# Patient Record
Sex: MALE | Race: WHITE | Employment: OTHER | URBAN - NONMETROPOLITAN AREA
[De-identification: names, ages, dates, MRNs, and addresses within clinical notes are randomized per-mention and may not be internally consistent; named-entity substitution may affect disease eponyms.]

---

## 2019-06-07 LAB
A/G RATIO: 2 (ref 1.5–2.5)
ABSOLUTE BASO #: 0 /CMM (ref 0–200)
ABSOLUTE EOS #: 300 /CMM (ref 0–500)
ABSOLUTE LYMPH #: 1300 /CMM (ref 1000–4800)
ABSOLUTE MONO #: 600 /CMM (ref 0–800)
ABSOLUTE NEUT #: 4700 /CMM (ref 1800–7700)
ALBUMIN SERPL-MCNC: 4.3 GM/DL (ref 3.5–5)
ALP BLD-CCNC: 119 IU/L (ref 41–137)
ALT SERPL-CCNC: 20 IU/L (ref 10–40)
ANION GAP SERPL CALCULATED.3IONS-SCNC: 11 MMOL/L (ref 4–12)
APTT: 77.9 SEC (ref 23–38)
AST SERPL-CCNC: 16 IU/L (ref 15–41)
BASOPHILS RELATIVE PERCENT: 0.2 % (ref 0–2)
BILIRUB SERPL-MCNC: 0.5 MG/DL (ref 0.2–1)
BUN BLDV-MCNC: 19 MG/DL (ref 7–20)
CALCIUM SERPL-MCNC: 9.4 MG/DL (ref 8.8–10.5)
CHLORIDE BLD-SCNC: 101 MEQ/L (ref 101–111)
CO2: 28 MEQ/L (ref 21–32)
CREAT SERPL-MCNC: 1.19 MG/DL (ref 0.6–1.3)
CREATININE CLEARANCE: 59
EOSINOPHILS RELATIVE PERCENT: 4.5 % (ref 0–6)
GLUCOSE: 136 MG/DL (ref 70–110)
HCT VFR BLD CALC: 39.9 % (ref 40–49)
HEMOGLOBIN: 13.2 GM/DL (ref 13.5–16.5)
LYMPHOCYTES RELATIVE PERCENT: 19.1 % (ref 15–45)
MCH RBC QN AUTO: 30.9 PG (ref 27.5–33)
MCHC RBC AUTO-ENTMCNC: 33 GM/DL (ref 33–36)
MCV RBC AUTO: 93.5 CU MIC (ref 80–97)
MONOCYTES RELATIVE PERCENT: 8.6 % (ref 2–10)
NEUTROPHILS RELATIVE PERCENT: 67.6 % (ref 40–70)
NUCLEATED RBCS: 0 /100 WBC
PDW BLD-RTO: 14.8 % (ref 12–16)
PLATELET # BLD: 207 TH/CMM (ref 150–400)
POTASSIUM SERPL-SCNC: 4 MEQ/L (ref 3.6–5)
RBC # BLD: 4.26 MIL/CMM (ref 4.5–6)
SODIUM BLD-SCNC: 140 MEQ/L (ref 135–145)
TOTAL PROTEIN: 6.5 G/DL (ref 6.2–8)
WBC # BLD: 6.9 TH/CMM (ref 4.4–10.5)

## 2020-06-29 LAB
A/G RATIO: 2.2 (ref 1.5–2.5)
ABSOLUTE BASO #: 100 /CMM (ref 0–200)
ABSOLUTE EOS #: 200 /CMM (ref 0–500)
ABSOLUTE LYMPH #: 1400 /CMM (ref 1000–4800)
ABSOLUTE MONO #: 600 /CMM (ref 0–800)
ABSOLUTE NEUT #: 3900 /CMM (ref 1800–7700)
ALBUMIN SERPL-MCNC: 4.3 GM/DL (ref 3.5–5)
ALP BLD-CCNC: 119 IU/L (ref 41–137)
ALT SERPL-CCNC: 24 IU/L (ref 10–40)
ANION GAP SERPL CALCULATED.3IONS-SCNC: 10 MMOL/L (ref 4–12)
APTT: 85.6 SEC (ref 23–38)
AST SERPL-CCNC: 18 IU/L (ref 15–41)
BASOPHILS RELATIVE PERCENT: 1.1 % (ref 0–2)
BILIRUB SERPL-MCNC: 0.5 MG/DL (ref 0.2–1)
BUN BLDV-MCNC: 29 MG/DL (ref 7–20)
CALCIUM SERPL-MCNC: 9.6 MG/DL (ref 8.8–10.5)
CHLORIDE BLD-SCNC: 103 MEQ/L (ref 101–111)
CO2: 24 MEQ/L (ref 21–32)
CREAT SERPL-MCNC: 1.35 MG/DL (ref 0.6–1.3)
CREATININE CLEARANCE: 51
EOSINOPHILS RELATIVE PERCENT: 3.9 % (ref 0–6)
GLUCOSE: 94 MG/DL (ref 70–110)
HCT VFR BLD CALC: 38 % (ref 40–49)
HEMOGLOBIN: 12.8 GM/DL (ref 13.5–16.5)
LYMPHOCYTES RELATIVE PERCENT: 22.2 % (ref 15–45)
MCH RBC QN AUTO: 31.4 PG (ref 27.5–33)
MCHC RBC AUTO-ENTMCNC: 33.6 GM/DL (ref 33–36)
MCV RBC AUTO: 93.4 CU MIC (ref 80–97)
MONOCYTES RELATIVE PERCENT: 10.1 % (ref 2–10)
NEUTROPHILS RELATIVE PERCENT: 62.7 % (ref 40–70)
NUCLEATED RBCS: 0.1 /100 WBC
PDW BLD-RTO: 14.4 % (ref 12–16)
PLATELET # BLD: 225 TH/CMM (ref 150–400)
POTASSIUM SERPL-SCNC: 4.5 MEQ/L (ref 3.6–5)
RBC # BLD: 4.07 MIL/CMM (ref 4.5–6)
SODIUM BLD-SCNC: 137 MEQ/L (ref 135–145)
TOTAL PROTEIN: 6.3 G/DL (ref 6.2–8)
WBC # BLD: 6.3 TH/CMM (ref 4.4–10.5)

## 2021-04-01 ENCOUNTER — HOSPITAL ENCOUNTER (OUTPATIENT)
Dept: PHYSICAL THERAPY | Age: 76
Setting detail: THERAPIES SERIES
Discharge: HOME | End: 2021-04-01
Payer: MEDICARE

## 2021-04-01 ENCOUNTER — HOSPITAL ENCOUNTER (OUTPATIENT)
Dept: OCCUPATIONAL THERAPY | Age: 76
Setting detail: THERAPIES SERIES
Discharge: HOME | End: 2021-04-01
Payer: MEDICARE

## 2021-04-01 PROCEDURE — 97162 PT EVAL MOD COMPLEX 30 MIN: CPT

## 2021-04-01 PROCEDURE — 97165 OT EVAL LOW COMPLEX 30 MIN: CPT

## 2021-04-01 NOTE — FLOWSHEET NOTE
care independently. He walked into the clinic today with a rollator and was extremely short of breath, however his oxygen saturation levels were 98%. He has a prior history of left SILVIO >10 yrs ago and history of lumbar fusion as well all of which contribute to difficulty walking causing him to walk with [facility] prior to recent hospitalization. He has continued with his HEP since being discharged and is working on adjusting to new medications. Social/Functional History and Current Status:  Medications and Allergies have been reviewed and are listed on Medical History Questionnaire. Gaetano Rose lives with family in an apartment with a level surface to enter.     Task Previous Current   ADLs  Independent Independent   IADL's Independent Independent   Ambulation Assistance Required Assistance Required   Transfers Independent Modified Independent   Recreation Independent Dependent/Unable   Community Integration Independent Independent   Driving Active  Does not drive   Work Retired  Retired     OBJECTIVE:    Pain: 0/10   Palpation    Observation Very pale complexion; dry, thin skin; bruises throughout his UE; grabs a hold of his pant leg to lift leg up to don/doff shoes    Posture Stands with forward flexed trunk       Range of Motion    Strength Left hip flexor, quads, hamstrings, abd/add: 3/5; gross strength of right LE: 4/5   Coordination    Sensation Diabetic neuropathy   Bed Mobility    Transfers Heavily dependent upon UE with all transfers   Ambulation Walks with rollator and stooped posture; heavily dependent upon UE for support   Stairs Ascends with right; descends with left    Balance Standing balance is fair   Special Tests          TREATMENT   Precautions:    Pain: 0/10    X in shaded column indicates activity completed today   Modalities Parameters/  Location  Notes                     Manual Therapy Time/Technique  Notes                     Exercise/Intervention   Notes   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 Patient Education:   [x]  HEP/Education Completed: Plan of Care, Goals, discussed treatment plan; aquatics    [employer] Access Code:  []  No new Education completed  [x]  Reviewed Prior HEP-provided by rehab      [x]  Patient verbalized and/or demonstrated understanding of education provided. []  Patient unable to verbalize and/or demonstrate understanding of education provided. Will continue education. [x]  Barriers to learning: n/a     PLAN:  Treatment Recommendations: Strengthening, Range of Motion, Balance Training, Functional Mobility Training, Endurance Training, Gait Training, Stair Training, Neuromuscular Re-education, Home Exercise Program, Patient Education, Safety Education and Training and Aquatics    [x]  Plan of care initiated. Plan to see patient 2 times per week for 6 weeks to address the treatment planned outlined above.   []  Continue with current plan of care  []  Modify plan of care as follows:    []  Hold pending physician visit  []  Discharge    Time In 0915   Time Out 1000   Timed Code Minutes: 0 min   Total Treatment Time: 45 min       Electronically Signed by: Selena Frances \"Gay\" Devin Hodgson, AUNDREA  [id number]

## 2021-04-01 NOTE — FLOWSHEET NOTE
hyperlipidemia, DVT, Lupus, polyarthritis, anticoagulation, asthma, pneumonia, pulmonary embolism, diabetes, hepatitis B, BPH, arthritis, basal ricky carcinoma, anemia, left total hip replacement, spinal fusion,      SUBJECTIVE: Patient reports he is set up to see an arthritis doctor which is 4 weeks off. Social/Functional History:  Medications and Allergies have been reviewed and are listed on the Medical History [address] lives With son currently. in a single story home with stairs and no handrail to enter. His house is multilevel. Task Prior Level of Function  (current level of function addressed below)   ADLs  Modified Independent. Increased time. 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 OBJECTIVE:  Hand Dominance right handed   Observation Right ulnar styloid enlarged   Posture    Edema Edema in B LEs       Vision Corrected   Hearing no hearing aid, but states he does not hear well. Cognition  WFL       ADL's: Per patient report: Feeding: Modified Independent. assist for cutting food if needed  Grooming: Independent. Bathing: Modified Independent. increased time  Upper Extremity Dressing: Minimal Assistance. assist with buttoning buttons, difficulty with zipping  Lower Extremity Dressing: Modified Independent. occasional assist with donning support socks. Has elastic laces so slips shoes on. Toileting: Modified Independent. Toilet Transfer: Modified Independent. uses counter top and cane. Tub Transfer: Modified Independent.  has grab bar  Shower Transfer: Not tested. Tub/Shower Set-Up Walk-in Shower at his house.   Tub/shower at [facility] Set-Up Handicapped [facility] in [address] [place] [address], [address] [place] [address] and [facility], 2 wheeled walker, cane, grab bar in tub shower, elastic shoe laces   Bed Mobility  Not Tested   Transfers Sit to Stand:  Modified Independent. Stand to Sit: Modified Independent. Balance SBA   Functional Mobility Assistive Device: 4 Wheeled Walker  Assist Level:  Modified independence. Distance: 30 feet, in clinic. Some shortness of breath reported. Pulse ox at 98%        Sensation Patient reports numbness and tingling in both hands. Coordination Impaired: Unable to to oppose to right 3, 4th and 5th digit due to thumb arthritis. Movement Descriptors within functional limits   Tone within functional limits       RIGHT and LEFT UPPER EXTREMITY  RANGE OF MOTION    AROM PROM COMMENTS      Bilateral shoulder ROM is [facility] [facility]   Shoulder Flexion      Shoulder Extension      Shoulder Abduction      Shoulder External Rotation      Shoulder Internal Rotation      Elbow Flexion WFL     Elbow Extension (-) 35 degrees     Wrist Flexion      Wrist Extension      General Hand ROM   Active left forearm supination to ~ neutral.  Patient reports left elbow was \"shattered\" and had surgery as a child.          RIGHT and LEFT UPPER EXTREMITY  STRENGTH     COMMENTS   Shoulder Flexion 4-/5    Shoulder Abduction 4-/5    Elbow Extension 4-/5    Elbow Flexion 4-/5    Wrist Extension     Wrist Flexion          RIGHT LEFT    Strength Settinnd 30 30   Pinch Strength Tip Pinch:      Lateral Pinch 13 7    3 Point Pinch        General Strength Comment        TREATMENT   Precautions: fall risk    Pain: none     X in shaded column indicates Activity Completed Today   Modalities Parameters/  Location  Notes/Comments                     Manual Therapy Time/  Technique  Notes/Comments                     Exercises   Sets/  Sec Reps  Notes/Comments                                                    Activities Time    Notes/Comments                       Specific Interventions Next Treatment: UE strengthening, endurance, HEP, ADLs, adaptive equipment and technique education    Activity/Treatment Tolerance:  [x]  Patient tolerated treatment well  []  Patient limited by fatigue  []  Patient limited by pain   []  Patient limited by other medical complications  []  Other:     Assessment: Patient with general deconditioning, decreased strength and endurance for previous level of activities. Patient has impaired fine motor coordination due to arthritis. Patient requires skilled OT to increase endurance, UE strength, ease with ADLs and education on adaptive equipment and techniques for ease and independence with self care and prior level of activities. Areas for Improvement: edema, impaired coordination, impaired endurance and impaired strength  Prognosis: fair/good    GOALS:  Patient Goal: Return to driving a car, walk better, and increase strength and endurance, mow the lawn. Short Term Goals:  Time Frame: 5 weeks  1. Patient will be independent with UE HEP for increased ease with eventual driving and car transfers. 2.  Patient will increase stand endurance x 6 minutes during UE functional activity for increased endurance for ambulation to and from car to appointments. 3.  Patient will increase B  strength to 35# for increased ease with opening jars. 4.  Patient will explore adaptive equipment and techniques for increased ease with self care and dressing. Long Term Goals:  Time Frame: 8 weeks  1. Patient will improve UEFS to at least 78.  2.  Patient will increase stand endurance to 8 minutes for increased endurance for ADLs. 3.  Patient will be independent with UE finalized HEP for ease with eventual return to mowing his lawn. Patient Education:   [x]  HEP/Education Completed: Plan of Care, Goals   [address] Access Code for HEP:   []  No new Education completed  []  Reviewed Prior HEP      [x]  Patient verbalized and/or demonstrated understanding of education provided.   []  Patient unable to verbalize and/or demonstrate understanding of education provided. Will continue education. [x]  Barriers to learning: none    PLAN:  Treatment Recommendations: Strengthening, Endurance Training and Home Exercise Program    [x]  Plan of care initiated. Plan to see patient 2 times per week for 10 weeks to address the treatment planned outlined above. []  Continue with current plan of care  []  Modify plan of care as follows:    []  Hold pending physician visit  []  Discharge    Time In 0835   Time Out 0915   Timed Code Minutes: 0 min   Total Treatment Time: 40 min       Electronically Signed by:  Jordan Pappas OTR/L #[id number]

## 2021-04-08 ENCOUNTER — HOSPITAL ENCOUNTER (OUTPATIENT)
Dept: OCCUPATIONAL THERAPY | Age: 76
Setting detail: THERAPIES SERIES
Discharge: HOME | End: 2021-04-08
Payer: MEDICARE

## 2021-04-08 ENCOUNTER — HOSPITAL ENCOUNTER (OUTPATIENT)
Dept: PHYSICAL THERAPY | Age: 76
Setting detail: THERAPIES SERIES
Discharge: HOME | End: 2021-04-08
Payer: MEDICARE

## 2021-04-08 PROCEDURE — 97110 THERAPEUTIC EXERCISES: CPT

## 2021-04-08 NOTE — PROGRESS NOTES
Specific Interventions Next Treatment: NuStep; standing balance activities; dynamic gait activities; endurance     Activity/Treatment Tolerance:  [x]  Patient tolerated treatment well  [x]  Patient limited by fatigue  []  Patient limited by pain   []  Patient limited by medical complications  []  Other:     Assessment: Pt struggles with endurance levels. 2 seated restbreaks required during standing exercises. O2 sats checked and stayed at 98%. Pitting edema noted in B calves. Body Structures/Functions/Activity Limitations: impaired activity tolerance, impaired balance, impaired coordination, impaired endurance, impaired ROM, impaired sensation, impaired strength and abnormal gait  Prognosis: fair    GOALS:  Patient Goal: Improve balance and stamina    Short Term Goals:  Time Frame: 3 weeks  1. Jose Manuel Del Toro will demonstrate a good ankle strategy when maintaining balance with perturbations to his trunk. 2. Jose Manuel Del Toro will demonstrate \"nose over toes\" positioning with sit-stand transfers without needing verbal cuing. 3. Doug's Tinetti score will improve to 19/28 indicating minimal fall risk. Long Term Goals:  Time Frame: 6 weeks   1. Jose Manuel Del Toro will transition from walking with rollator to [facility] for greater ease with community ambulation and to return to [facility]. 2. Jose Manuel Del Toro will be discharged from PT with independent HEP. 3. Doug's Tinetti score will improve to >23/28 indicating no fall risk. 4. 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 Patient Education:   [x]  HEP/Education Completed: at countertop: heel toe raises, marching, mini squats    [employer] Access Code:  []  No new Education completed  []  Reviewed Prior HEP-provided by rehab      [x]  Patient verbalized and/or demonstrated understanding of education provided. []  Patient unable to verbalize and/or demonstrate understanding of education provided.   Will continue education. [x]  Barriers to learning: n/a     PLAN:  Treatment Recommendations: Strengthening, Range of Motion, Balance Training, Functional Mobility Training, Endurance Training, Gait Training, Stair Training, Neuromuscular Re-education, Home Exercise Program, Patient Education, Safety Education and Training and Aquatics    []  Plan of care initiated. Plan to see patient 2 times per week for 6 weeks to address the treatment planned outlined above.   [x]  Continue with current plan of care  []  Modify plan of care as follows:    []  Hold pending physician visit  []  Discharge    Time In 0900   Time Out 0930   Timed Code Minutes: 30 min   Total Treatment Time: 30 min       Electronically Signed by: Abhay Mcclain

## 2021-04-08 NOTE — PROGRESS NOTES
[address]  [] EVALUATION  [x] DAILY NOTE (LAND) [] DAILY NOTE (AQUATIC ) [] PROGRESS NOTE [] DISCHARGE NOTE    [] OUTPATIENT REHABILITATION CENTER [facility]   [] Brenda     [] [address] [facility]   [x] Dierdre Leaver    Date: 2021  Patient Name:  Ria Penn  : 1945  MRN: [id number]  CSN: [id number]    Referring Practitioner Karina BOYD   Diagnosis Gout    Treatment Diagnosis Impaired UE strength, impaired endurance, decreased ADLs   Date of Evaluation 21      Functional Outcome Measure Used Functional Impact Questionaire   Functional Outcome Score 68 (21)       Insurance: Primary: Payor: MEDICARE /  /  / ,   Secondary: [facility]   Authorization Information: Unlimited visits based on medical necessity   Visit # 2, 2/10 for progress note   Visits Allowed: Unlimited based on medical necessity   Recertification Date:    Physician Follow-Up: Not stated   Physician Orders: Script dated 3/25/2021 for OT eval and treat   Pertinent History: Patient was admitted to [facility] on 3/12/21 with lower extremity swelling, deconditioned state. Negative for DVT. Patient had inpatient rehab due to deconditioning. PMH includes per medical notes from [facility] includes: Neuropathy, hypertension, hypercholesterolemia, hyperlipidemia, DVT, Lupus, polyarthritis, anticoagulation, asthma, pneumonia, pulmonary embolism, diabetes, hepatitis B, BPH, arthritis, basal ricky carcinoma, anemia, left total hip replacement, spinal fusion,      SUBJECTIVE: Patient reports he gets worn out easy. He has been doing some exercises at home.   Patient reports he has a bid blister on his right index finger tip and is unsure of how it happened    OBJECTIVE:      TREATMENT   Precautions: fall risk    Pain: none     X in shaded column indicates Activity Completed Today   Modalities Parameters/  Location  Notes/Comments Manual Therapy Time/  Technique  Notes/Comments                     Exercises   Sets/  Sec Reps  Notes/Comments   Yellow putty for B  and pinch strengthening   X 3 minutes   [place] theraband in sitting for B horizontal abduction, shoulder flexion and diaganols up  10 reps each X 15 reps for horizontal abduction   Patient ambulated with 4 wheeled walker and modified independence in clinic   X 30 feet, then 20 feet                               Activities Time    Notes/Comments   Discussed adaptive equipment options available for in the bathroom, dressing, writing, eating. X Adaptive equipment catalog issued                 Specific Interventions Next Treatment: UE strengthening, endurance, HEP, ADLs, adaptive equipment and technique education    Activity/Treatment Tolerance:  [x]  Patient tolerated treatment well  []  Patient limited by fatigue  []  Patient limited by pain   []  Patient limited by other medical complications  []  Other:     Assessment: Patient tolerated treatment well. Patient with limited endurance for tasks. Areas for Improvement: edema, impaired coordination, impaired endurance and impaired strength  Prognosis: fair/good    GOALS:  Patient Goal: Return to driving a car, walk better, and increase strength and endurance, mow the lawn. Short Term Goals:  Time Frame: 5 weeks  1. Patient will be independent with UE HEP for increased ease with eventual driving and car transfers. 2.  Patient will increase stand endurance x 6 minutes during UE functional activity for increased endurance for ambulation to and from car to appointments. 3.  Patient will increase B  strength to 35# for increased ease with opening jars. 4.  Patient will explore adaptive equipment and techniques for increased ease with self care and dressing. Long Term Goals:  Time Frame: 8 weeks  1.   Patient will improve UEFS to at least 78.  2.  Patient will increase stand endurance to 8 minutes for increased endurance for ADLs. 3.  Patient will be independent with UE finalized HEP for ease with eventual return to mowing his lawn. Patient Education:   [x]  HEP/Education Completed: Plan of Care, Goals, adaptive equipment options available for ADLS, IADLs with catalog issued   [address] Access Code for HEP:   []  No new Education completed  [x]  Reviewed Prior HEP      [x]  Patient verbalized and/or demonstrated understanding of education provided. []  Patient unable to verbalize and/or demonstrate understanding of education provided. Will continue education. [x]  Barriers to learning: none    PLAN:  Treatment Recommendations: Strengthening, Endurance Training and Home Exercise Program    []  Plan of care initiated. Plan to see patient 2 times per week for 10 weeks to address the treatment planned outlined above. [x]  Continue with current plan of care  []  Modify plan of care as follows:    []  Hold pending physician visit  []  Discharge    Time In [id number]   Time Out 0900   Timed Code Minutes: 0 min   Total Treatment Time: 26 min       Electronically Signed by:  Chelsi Salcido OTR/L #[id number]

## 2021-04-13 ENCOUNTER — HOSPITAL ENCOUNTER (OUTPATIENT)
Dept: PHYSICAL THERAPY | Age: 76
Setting detail: THERAPIES SERIES
Discharge: HOME | End: 2021-04-13
Payer: MEDICARE

## 2021-04-13 PROCEDURE — 97110 THERAPEUTIC EXERCISES: CPT

## 2021-04-13 NOTE — PROGRESS NOTES
[address] [facility]  PHYSICAL THERAPY  [] EVALUATION  [x] DAILY NOTE (LAND) [] DAILY NOTE (AQUATIC ) [] PROGRESS NOTE [] DISCHARGE NOTE    [] [facility]   [] Brenda [address]    [] [address] [facility]   [x] Tigist Mueller    Date: 2021  Patient Name:  Armando Holland  : 1945  MRN: [id number]  CSN: [id number]    Referring Practitioner DEB Krishnan -*   Diagnosis Gouty Arthropathy    Treatment Diagnosis Difficulty walking; balance dysfunction    Date of Evaluation 21    Additional Pertinent History DM; SOB; HTN      Functional Outcome Measure Used Tinetti   Functional Outcome Score  (21)       Insurance: Primary: Payor: Gerardo Mistry /  /  / ,   Secondary:    Authorization Information: Pre-certification not needed    Visit # 3, 3/10 for progress note   Visits Allowed: Based on medical necessity    Recertification Date: May 27, 21   Physician Follow-Up:    Physician Orders:    History of Present Illness: Jordan Ambriz was recently discharged from [facility] rehab following hospitalization for cellulitis in left LE. SUBJECTIVE: Pt. Reports doing exercises at home and using a SC toambulate at home    TREATMENT   Precautions:    Pain: 0/10 R knee, 0/10 L knee    X in shaded column indicates activity completed today   Modalities Parameters/  Location  Notes                     Manual Therapy Time/Technique  Notes                     Exercise/Intervention   Notes   Discussed HEP provided by rehab staff at [facility]; provided blue band   x           Nustep level5 5min.  x           Dynamic gait in // bars: forward, side stepping, retro 2 laps  x           Heel toe raises, marching, mini squats 10x    x           Sitting: LAQ, marching, Hip abduction, adductor squeeze 10x  x 5x=with hip abduction          Sit to stand 5x  x B UE support required     Specific Interventions Next Treatment: NuStep; standing balance activities; dynamic gait activities; endurance     Activity/Treatment Tolerance:  [x]  Patient tolerated treatment well  [x]  Patient limited by fatigue  []  Patient limited by pain   []  Patient limited by medical complications  []  Other:     Assessment: Pt struggles with endurance levels. 2 seated restbreaks required during standing exercises. O2 sats checked and stayed at 98%. Pitting edema noted in B calves. Body Structures/Functions/Activity Limitations: impaired activity tolerance, impaired balance, impaired coordination, impaired endurance, impaired ROM, impaired sensation, impaired strength and abnormal gait  Prognosis: fair    GOALS:  Patient Goal: Improve balance and stamina    Short Term Goals:  Time Frame: 3 weeks  1. Radha Giron will demonstrate a good ankle strategy when maintaining balance with perturbations to his trunk. 2. Radha Giron will demonstrate \"nose over toes\" positioning with sit-stand transfers without needing verbal cuing. 3. Doug's Tinetti score will improve to 19/28 indicating minimal fall risk. Long Term Goals:  Time Frame: 6 weeks   1. Radha Giron will transition from walking with rollator to [facility] for greater ease with community ambulation and to return to [facility]. 2. Radha Giron will be discharged from PT with independent HEP. 3. Doug's Tinetti score will improve to >23/28 indicating no fall risk. 4. 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 Patient Education:   [x]  HEP/Education Completed: at countertop: heel toe raises, marching, mini squats    [employer] Access Code:  []  No new Education completed  []  Reviewed Prior HEP-provided by rehab      [x]  Patient verbalized and/or demonstrated understanding of education provided. []  Patient unable to verbalize and/or demonstrate understanding of education provided. Will continue education.   [x]  Barriers to learning: n/a     PLAN:  Treatment

## 2021-04-15 ENCOUNTER — HOSPITAL ENCOUNTER (OUTPATIENT)
Dept: PHYSICAL THERAPY | Age: 76
Setting detail: THERAPIES SERIES
Discharge: HOME | End: 2021-04-15
Payer: MEDICARE

## 2021-04-15 ENCOUNTER — HOSPITAL ENCOUNTER (OUTPATIENT)
Dept: OCCUPATIONAL THERAPY | Age: 76
Setting detail: THERAPIES SERIES
Discharge: HOME | End: 2021-04-15
Payer: MEDICARE

## 2021-04-15 PROCEDURE — 97112 NEUROMUSCULAR REEDUCATION: CPT

## 2021-04-15 PROCEDURE — 97140 MANUAL THERAPY 1/> REGIONS: CPT

## 2021-04-15 PROCEDURE — 97110 THERAPEUTIC EXERCISES: CPT

## 2021-04-15 NOTE — PROGRESS NOTES
column indicates Activity Completed Today   Modalities Parameters/  Location  Notes/Comments                     Manual Therapy Time/  Technique  Notes/Comments                     Exercises   Sets/  Sec Reps  Notes/Comments   Yellow putty for Left  and pinch strengthening    3 minutes   [place] theraband in sitting for B horizontal abduction, shoulder flexion and diaganols up  10 reps each X    Patient ambulated with 4 wheeled walker and modified independence in clinic    30 feet, then 20 feet   STM to right wrist with concentration on ulnar side for pain relief and tight musculature   X Mild crepitus palpated on ulnar side. Patient states this helped the pain. Yellow spring loaded gripper (10#) resistance   10 each X Left hand and right hand   PROM to right wrist flexion/extension and right digits individual and composite flexion/extension   X    Biodex at 120 RPM, 2 minutes forward and 1 minute backward   X    Activities Time    Notes/Comments   Discussed adaptive equipment options available for in the bathroom, dressing, writing, eating. Adaptive equipment catalog issued                 Specific Interventions Next Treatment: UE strengthening, endurance, HEP, ADLs, adaptive equipment and technique education    Activity/Treatment Tolerance:  [x]  Patient tolerated treatment well  []  Patient limited by fatigue  []  Patient limited by pain   []  Patient limited by other medical complications  []  Other:     Assessment: Patient tolerated treatment well. Patient with limited endurance for tasks. Areas for Improvement: edema, impaired coordination, impaired endurance and impaired strength  Prognosis: fair/good    GOALS:  Patient Goal: Return to driving a car, walk better, and increase strength and endurance, mow the lawn. Short Term Goals:  Time Frame: 5 weeks  1. Patient will be independent with UE HEP for increased ease with eventual driving and car transfers.   2.  Patient will increase stand endurance x 6 minutes during UE functional activity for increased endurance for ambulation to and from car to appointments. 3.  Patient will increase B  strength to 35# for increased ease with opening jars. 4.  Patient will explore adaptive equipment and techniques for increased ease with self care and dressing. Long Term Goals:  Time Frame: 8 weeks  1. Patient will improve UEFS to at least 78.  2.  Patient will increase stand endurance to 8 minutes for increased endurance for ADLs. 3.  Patient will be independent with UE finalized HEP for ease with eventual return to mowing his lawn. Patient Education:   []  HEP/Education Completed: Plan of Care, Goals, adaptive equipment options available for ADLS, IADLs with catalog issued   [address] Access Code for HEP:   []  No new Education completed  [x]  Reviewed Prior HEP      [x]  Patient verbalized and/or demonstrated understanding of education provided. []  Patient unable to verbalize and/or demonstrate understanding of education provided. Will continue education. [x]  Barriers to learning: none    PLAN:  Treatment Recommendations: Strengthening, Endurance Training and Home Exercise Program    []  Plan of care initiated. Plan to see patient 2 times per week for 10 weeks to address the treatment planned outlined above. [x]  Continue with current plan of care  []  Modify plan of care as follows:    []  Hold pending physician visit  []  Discharge    Time In 0933   Time Out 1013   Timed Code Minutes: 40 min   Total Treatment Time: 40 min       Electronically Signed by:  Baldo STOKES/L #[id number]

## 2021-04-15 NOTE — PROGRESS NOTES
[address] [facility]  PHYSICAL THERAPY  [] EVALUATION  [x] DAILY NOTE (LAND) [] DAILY NOTE (AQUATIC ) [] PROGRESS NOTE [] DISCHARGE NOTE    [] [facility]   [] [address]    [] [address] [facility]   [x] [place]    Date: 4/15/2021  Patient Name:  Alysha Engel  : 1945  MRN: [id number]  CSN: [id number]    Referring Practitioner DEB Velasquez -*   Diagnosis Gouty Arthropathy    Treatment Diagnosis Difficulty walking; balance dysfunction    Date of Evaluation 21    Additional Pertinent History DM; SOB; HTN      Functional Outcome Measure Used Tinetti   Functional Outcome Score  (21)       Insurance: Primary: Payor: Bob Uribe /  /  / ,   Secondary:    Authorization Information: Pre-certification not needed    Visit # 4, 4/10 for progress note   Visits Allowed: Based on medical necessity    Recertification Date: May 27, 21   Physician Follow-Up:    Physician Orders:    History of Present Illness: Ester Charlton was recently discharged from [facility] rehab following hospitalization for cellulitis in left LE. SUBJECTIVE: Pt. Reports getting a lot of work done at home yesterday    TREATMENT   Precautions:    Pain:4/10 R wrist    X in shaded column indicates activity completed today   Modalities Parameters/  Location  Notes                     Manual Therapy Time/Technique  Notes                     Exercise/Intervention   Notes   Discussed HEP provided by rehab staff at [facility]; provided blue band              Nustep level5 5min.  x           Dynamic gait in // bars: forward, side stepping, retro 2 laps  x           Heel toe raises, marching, mini squats 10x    x           Sitting: LAQ, marching, Hip abduction, adductor squeeze 10x  x 5x=with hip abduction          Sit to stand 5x  x B UE support required     Specific Interventions Next Treatment: NuStep; standing balance activities; dynamic gait activities; endurance     Activity/Treatment Tolerance:  [x]  Patient tolerated treatment well  [x]  Patient limited by fatigue  []  Patient limited by pain   []  Patient limited by medical complications  []  Other:     Assessment: Pt struggles with endurance levels. 2 seated restbreaks required during standing exercises. Body Structures/Functions/Activity Limitations: impaired activity tolerance, impaired balance, impaired coordination, impaired endurance, impaired ROM, impaired sensation, impaired strength and abnormal gait  Prognosis: fair    GOALS:  Patient Goal: Improve balance and stamina    Short Term Goals:  Time Frame: 3 weeks  1. Antonia Dennis will demonstrate a good ankle strategy when maintaining balance with perturbations to his trunk. 2. Antonia Dennis will demonstrate \"nose over toes\" positioning with sit-stand transfers without needing verbal cuing. 3. Doug's Tinetti score will improve to 19/28 indicating minimal fall risk. Long Term Goals:  Time Frame: 6 weeks   1. Antonia Dennis will transition from walking with rollator to [facility] for greater ease with community ambulation and to return to [facility]. 2. Antonia Dennis will be discharged from PT with independent HEP. 3. Doug's Tinetti score will improve to >23/28 indicating no fall risk. 4. Antonia Dennis will be able to walk 200 ft at a time without needing a rest break so he is able to walk into the restaurant from his car without fatigue. Patient Education:   [x]  HEP/Education Completed: at countertop: heel toe raises, marching, mini squats    [employer] Access Code:  []  No new Education completed  []  Reviewed Prior HEP-provided by rehab      [x]  Patient verbalized and/or demonstrated understanding of education provided. []  Patient unable to verbalize and/or demonstrate understanding of education provided. Will continue education.   [x]  Barriers to learning: n/a     PLAN:  Treatment Recommendations: Strengthening, Range of Motion, Balance Training, Functional Mobility Training, Endurance Training, Gait Training, Stair Training, Neuromuscular Re-education, Home Exercise Program, Patient Education, Safety Education and Training and Aquatics    []  Plan of care initiated. Plan to see patient 2 times per week for 6 weeks to address the treatment planned outlined above.   [x]  Continue with current plan of care  []  Modify plan of care as follows:    []  Hold pending physician visit  []  Discharge    Time In 0900   Time Out 0930   Timed Code Minutes: 30 min   Total Treatment Time: 30 min       Electronically Signed by: Micaela Adams

## 2021-04-19 ENCOUNTER — HOSPITAL ENCOUNTER (OUTPATIENT)
Dept: PHYSICAL THERAPY | Age: 76
Setting detail: THERAPIES SERIES
Discharge: HOME | End: 2021-04-19
Payer: MEDICARE

## 2021-04-19 ENCOUNTER — HOSPITAL ENCOUNTER (OUTPATIENT)
Dept: OCCUPATIONAL THERAPY | Age: 76
Setting detail: THERAPIES SERIES
Discharge: HOME | End: 2021-04-19
Payer: MEDICARE

## 2021-04-19 PROCEDURE — 97140 MANUAL THERAPY 1/> REGIONS: CPT

## 2021-04-19 PROCEDURE — 97110 THERAPEUTIC EXERCISES: CPT

## 2021-04-19 NOTE — PROGRESS NOTES
[address] [facility]  PHYSICAL THERAPY  [] EVALUATION  [x] DAILY NOTE (LAND) [] DAILY NOTE (AQUATIC ) [] PROGRESS NOTE [] DISCHARGE NOTE    [] [address] [facility]   [] Brenda     [] [address] AKI   [x] Leticia Stoner    Date: 2021  Patient Name:  Rogelio Johnson  : 1945  MRN: [id number]  CSN: [id number]    Referring Practitioner DEB Phillips -*   Diagnosis Gouty Arthropathy    Treatment Diagnosis Difficulty walking; balance dysfunction    Date of Evaluation 21    Additional Pertinent History DM; SOB; HTN      Functional Outcome Measure Used Tinetti   Functional Outcome Score  (21)       Insurance: Primary: Payor: Kiko Clemons /  /  / ,   Secondary:    Authorization Information: Pre-certification not needed    Visit # 5 5/10 for progress note   Visits Allowed: Based on medical necessity    Recertification Date: May 27, 21   Physician Follow-Up:    Physician Orders:    History of Present Illness: Inga Caceres was recently discharged from [facility] rehab following hospitalization for cellulitis in left LE.      SUBJECTIVE: Pt. Reports getting a lot of work done at home yesterday    TREATMENT   Precautions:    Pain:4/10 R wrist    X in shaded column indicates activity completed today   Modalities Parameters/  Location  Notes                     Manual Therapy Time/Technique  Notes   Stretch right hip flexor off edge of mat--long leg traction to stretch  - 5 min  Also quad sets to straigthen knee  x                Exercise/Intervention   Notes   Discussed HEP provided by rehab staff at [facility]; provided blue band              Nustep  Arms 10 , legs 11  Level 5 6 min x           Dynamic gait in // bars: forward, side stepping, retro 2 laps  x           Heel toe raises, marching, mini squats 10x    x  unable to do standing heel raise          Sitting: LAQ, marching, Hip abduction with green band , adductor

## 2021-04-19 NOTE — PROGRESS NOTES
[address] THERAPY  [] EVALUATION  [x] DAILY NOTE (LAND) [] DAILY NOTE (AQUATIC ) [] PROGRESS NOTE [] DISCHARGE NOTE    [] [facility]   [] Brenda Jesus    [] [address] AKI   [x] Delicia Potter    Date: 2021  Patient Name:  Hay Ng  : 1945  MRN: [id number]  CSN: [id number]    Referring Practitioner Manuela BOYD   Diagnosis Gout    Treatment Diagnosis Impaired UE strength, impaired endurance, decreased ADLs   Date of Evaluation 21      Functional Outcome Measure Used Functional Impact Questionaire   Functional Outcome Score 68 (21)       Insurance: Primary: Payor: MEDICARE /  /  / ,   Secondary: [facility]   Authorization Information: Unlimited visits based on medical necessity   Visit # 4, 4/10 for progress note   Visits Allowed: Unlimited based on medical necessity   Recertification Date: 3/76/3683   Physician Follow-Up: Not stated   Physician Orders: Script dated 3/25/2021 for OT eval and treat   Pertinent History: Patient was admitted to [facility] on 3/12/21 with lower extremity swelling, deconditioned state. Negative for DVT. Patient had inpatient rehab due to deconditioning. PMH includes per medical notes from [facility] includes: Neuropathy, hypertension, hypercholesterolemia, hyperlipidemia, DVT, Lupus, polyarthritis, anticoagulation, asthma, pneumonia, pulmonary embolism, diabetes, hepatitis B, BPH, arthritis, basal ricky carcinoma, anemia, left total hip replacement, spinal fusion,      SUBJECTIVE: Patient reports his right index finger is cold more than his other ones.     OBJECTIVE:      TREATMENT   Precautions: fall risk    Pain: 3/10 right wrist     X in shaded column indicates Activity Completed Today   Modalities Parameters/  Location  Notes/Comments                     Manual Therapy Time/  Technique  Notes/Comments   STM to right wrist and hand for pain control and tight musculature, concentration on ulnar side for pain relief  X Mild crepitus noted on ulnar side. Patient reports this helped pain. Exercises   Sets/  Sec Reps  Notes/Comments   Yellow putty for Left  and pinch strengthening    3 minutes   [place] theraband in sitting for B horizontal abduction, shoulder flexion and diaganols up  15 reps  X 10 reps for shoulder flexion   Patient ambulated with 4 wheeled walker and modified independence in clinic    30 feet, then 20 feet   Yellow spring loaded gripper (10#) resistance   10 each X Left hand and right hand   PROM to right wrist flexion/extension and right digits individual and composite flexion/extension   X    Biodex at 120 RPM, 2 minutes forward and 2 minute backwards   X    Activities Time    Notes/Comments   Discussed adaptive equipment options available for in the bathroom, dressing, writing, eating. Adaptive equipment catalog issued   Discussed compression glove for warmth, comfort and compression for arthritis. X Information given for ordering with recommended size           Specific Interventions Next Treatment: UE strengthening, endurance, HEP, ADLs, adaptive equipment and technique education    Activity/Treatment Tolerance:  [x]  Patient tolerated treatment well  []  Patient limited by fatigue  []  Patient limited by pain   []  Patient limited by other medical complications  []  Other:     Assessment: Patient tolerated treatment well. Patient with limited endurance for tasks. Areas for Improvement: edema, impaired coordination, impaired endurance and impaired strength  Prognosis: fair/good    GOALS:  Patient Goal: Return to driving a car, walk better, and increase strength and endurance, mow the lawn. Short Term Goals:  Time Frame: 5 weeks  1. Patient will be independent with UE HEP for increased ease with eventual driving and car transfers.   2.  Patient will increase stand endurance x 6 minutes during UE functional activity for increased endurance for ambulation to and from car to appointments. 3.  Patient will increase B  strength to 35# for increased ease with opening jars. 4.  Patient will explore adaptive equipment and techniques for increased ease with self care and dressing. Long Term Goals:  Time Frame: 8 weeks  1. Patient will improve UEFS to at least 78.  2.  Patient will increase stand endurance to 8 minutes for increased endurance for ADLs. 3.  Patient will be independent with UE finalized HEP for ease with eventual return to mowing his lawn. Patient Education:   []  HEP/Education Completed: Plan of Care, Goals, adaptive equipment options available for ADLS, IADLs with catalog issued   [address] Access Code for HEP:   []  No new Education completed  [x]  Reviewed Prior HEP, discussed compression gloves for arthritis for warmth and comfort with handout provided      [x]  Patient verbalized and/or demonstrated understanding of education provided. []  Patient unable to verbalize and/or demonstrate understanding of education provided. Will continue education. [x]  Barriers to learning: none    PLAN:  Treatment Recommendations: Strengthening, Endurance Training and Home Exercise Program    []  Plan of care initiated. Plan to see patient 2 times per week for 10 weeks to address the treatment planned outlined above. [x]  Continue with current plan of care  []  Modify plan of care as follows:    []  Hold pending physician visit  []  Discharge    Time In 0917   Time Out 0959   Timed Code Minutes: 42 min   Total Treatment Time: 42 min       Electronically Signed by:  Librado Munson OTR/L #[id number]

## 2021-04-22 ENCOUNTER — HOSPITAL ENCOUNTER (OUTPATIENT)
Dept: PHYSICAL THERAPY | Age: 76
Setting detail: THERAPIES SERIES
Discharge: HOME | End: 2021-04-22
Payer: MEDICARE

## 2021-04-22 ENCOUNTER — HOSPITAL ENCOUNTER (OUTPATIENT)
Dept: OCCUPATIONAL THERAPY | Age: 76
Setting detail: THERAPIES SERIES
Discharge: HOME | End: 2021-04-22
Payer: MEDICARE

## 2021-04-22 PROCEDURE — 97140 MANUAL THERAPY 1/> REGIONS: CPT

## 2021-04-22 PROCEDURE — 97110 THERAPEUTIC EXERCISES: CPT

## 2021-04-22 PROCEDURE — 97530 THERAPEUTIC ACTIVITIES: CPT

## 2021-04-22 NOTE — PROGRESS NOTES
right wrist and hand for pain control and tight musculature, concentration on ulnar side for pain relief  X Mild crepitus noted on ulnar side. Patient reports this helped pain. Exercises   Sets/  Sec Reps  Notes/Comments          [place] theraband in sitting for B horizontal abduction, shoulder flexion and diaganols up  15 reps  X 10 reps for shoulder flexion   Patient ambulated with 4 wheeled walker and modified independence in clinic   X 30 feet, then 20 feet   Yellow spring loaded gripper (10#) resistance   20 each X Left hand and right hand   PROM to right wrist flexion/extension and right digits individual and composite flexion/extension   X    [place] theraband in standing for B rows, shoulder flexion and shoulder extension   X Stood x 4 minutes with 3 rest breaks due to back discomfort   Biodex at 120 RPM, 2 minutes forward and 3 minute backwards   X    Activities Time    Notes/Comments   Discussed adaptive equipment options available for in the bathroom, dressing, writing, eating. Adaptive equipment catalog issued   Discussed compression glove for warmth, comfort and compression for arthritis. X Information given for ordering with recommended size           Specific Interventions Next Treatment: UE strengthening, endurance, HEP, ADLs, adaptive equipment and technique education    Activity/Treatment Tolerance:  [x]  Patient tolerated treatment well  []  Patient limited by fatigue  []  Patient limited by pain   []  Patient limited by other medical complications  []  Other:     Assessment: Patient tolerated treatment well. Patient with limited standing tolerance. Areas for Improvement: edema, impaired coordination, impaired endurance and impaired strength  Prognosis: fair/good    GOALS:  Patient Goal: Return to driving a car, walk better, and increase strength and endurance, mow the lawn. Short Term Goals:  Time Frame: 5 weeks  1.   Patient will be independent with UE HEP for increased ease with eventual driving and car transfers. 2.  Patient will increase stand endurance x 6 minutes during UE functional activity for increased endurance for ambulation to and from car to appointments. 3.  Patient will increase B  strength to 35# for increased ease with opening jars. 4.  Patient will explore adaptive equipment and techniques for increased ease with self care and dressing. Long Term Goals:  Time Frame: 8 weeks  1. Patient will improve UEFS to at least 78.  2.  Patient will increase stand endurance to 8 minutes for increased endurance for ADLs. 3.  Patient will be independent with UE finalized HEP for ease with eventual return to mowing his lawn. Patient Education:   []  HEP/Education Completed: Plan of Care, Goals, adaptive equipment options available for ADLS, IADLs with catalog issued   [address] Access Code for HEP:   []  No new Education completed  [x]  Reviewed Prior HEP      [x]  Patient verbalized and/or demonstrated understanding of education provided. []  Patient unable to verbalize and/or demonstrate understanding of education provided. Will continue education. [x]  Barriers to learning: none    PLAN:  Treatment Recommendations: Strengthening, Endurance Training and Home Exercise Program    []  Plan of care initiated. Plan to see patient 2 times per week for 10 weeks to address the treatment planned outlined above. [x]  Continue with current plan of care  []  Modify plan of care as follows:    []  Hold pending physician visit  []  Discharge    Time In 0926   Time Out 1011   Timed Code Minutes: 45 min   Total Treatment Time: 45 min       Electronically Signed by:  Anne-Marie Mccoy OTR/L #[id number]

## 2021-04-22 NOTE — PROGRESS NOTES
[facility]  PHYSICAL THERAPY  [] EVALUATION  [x] DAILY NOTE (LAND) [] DAILY NOTE (AQUATIC ) [] PROGRESS NOTE [] DISCHARGE NOTE    [] [facility]   [] Brenda [address]    [] [address] [facility]   [x] Dave Vidales    Date: 2021  Patient Name:  Taya Morgan  : 1945  MRN: [id number]  CSN: [id number]    Referring Practitioner DEB Navarrete -*   Diagnosis Gouty Arthropathy    Treatment Diagnosis Difficulty walking; balance dysfunction    Date of Evaluation 21    Additional Pertinent History DM; SOB; HTN      Functional Outcome Measure Used Tinetti   Functional Outcome Score  (21)       Insurance: Primary: Payor: Huang Chen /  /  / ,   Secondary:    Authorization Information: Pre-certification not needed    Visit # 6 6/10 for progress note   Visits Allowed: Based on medical necessity    Recertification Date: May 27, 21   Physician Follow-Up:    Physician Orders:    History of Present Illness: Adeel oCbos was recently discharged from [facility], [place], operated by [facility] rehab following hospitalization for cellulitis in left LE. SUBJECTIVE: Doing well; is driving again.     TREATMENT   Precautions:    Pain:4/10 R wrist    X in shaded column indicates activity completed today   Modalities Parameters/  Location  Notes                     Manual Therapy Time/Technique  Notes   Stretch right hip flexor off edge of mat--long leg traction to stretch  - 5 min  Also quad sets to straigthen knee                  Exercise/Intervention   Notes   Discussed HEP provided by rehab staff at [facility], [place], operated by [facility]; provided blue band              Nustep  Arms 10 , legs 11  Level 5 7 min x           Dynamic gait in // bars: forward, side stepping, retro; tandem 2 laps  x           Heel toe raises, marching, mini squats 10x    x  unable to do standing heel raise; on AIREX          Sitting: LAQ, marching, Hip abduction with green band , adductor squeeze 10 x  x Green t band sitting   PF  10 x       Sit to stand 5x  x B UE support required     Specific Interventions Next Treatment: NuStep; standing balance activities; dynamic gait activities; endurance     Activity/Treatment Tolerance:  [x]  Patient tolerated treatment well  [x]  Patient limited by fatigue  []  Patient limited by pain   []  Patient limited by medical complications  []  Other:     Assessment: Continues to require rest breaks after 3-4 min of exertion. Very talkative today; reminiscing about family members and growing up. Body Structures/Functions/Activity Limitations: impaired activity tolerance, impaired balance, impaired coordination, impaired endurance, impaired ROM, impaired sensation, impaired strength and abnormal gait  Prognosis: fair    GOALS:  Patient Goal: Improve balance and stamina    Short Term Goals:  Time Frame: 3 weeks  1. Birdie Mojica will demonstrate a good ankle strategy when maintaining balance with perturbations to his trunk. 2. Birdie Mojica will demonstrate \"nose over toes\" positioning with sit-stand transfers without needing verbal cuing. 3. Doug's Tinetti score will improve to 19/28 indicating minimal fall risk. Long Term Goals:  Time Frame: 6 weeks   1. Birdie Mojica will transition from walking with rollator to [facility] for greater ease with community ambulation and to return to [facility]. 2. Birdie Mojica will be discharged from PT with independent HEP. 3. Doug's Tinetti score will improve to >23/28 indicating no fall risk. 4. Birdie Mojica will be able to walk 200 ft at a time without needing a rest break so he is able to walk into the restaurant from his car without fatigue. Patient Education:   [x]  HEP/Education Completed: at countertop: heel toe raises, marching, mini squats    [facility] Access Code:  []  No new Education completed  []  Reviewed Prior HEP-provided by rehab      [x]  Patient verbalized and/or demonstrated understanding of education provided.   []  Patient unable to verbalize and/or demonstrate understanding of education provided. Will continue education. [x]  Barriers to learning: n/a     PLAN:  Treatment Recommendations: Strengthening, Range of Motion, Balance Training, Functional Mobility Training, Endurance Training, Gait Training, Stair Training, Neuromuscular Re-education, Home Exercise Program, Patient Education, Safety Education and Training and Aquatics    []  Plan of care initiated. Plan to see patient 2 times per week for 6 weeks to address the treatment planned outlined above.   [x]  Continue with current plan of care  []  Modify plan of care as follows:    []  Hold pending physician visit  []  Discharge    Time In 1013   Time Out 1055   Timed Code Minutes: 42 min   Total Treatment Time: 42 min       Electronically Signed by: Gretel Soulier DPT Treinta Y Tres [id number]"Clive Gauthier DPT  [id number]

## 2021-04-26 ENCOUNTER — HOSPITAL ENCOUNTER (OUTPATIENT)
Dept: OCCUPATIONAL THERAPY | Age: 76
Setting detail: THERAPIES SERIES
Discharge: HOME | End: 2021-04-26
Payer: MEDICARE

## 2021-04-26 ENCOUNTER — HOSPITAL ENCOUNTER (OUTPATIENT)
Dept: PHYSICAL THERAPY | Age: 76
Setting detail: THERAPIES SERIES
Discharge: HOME | End: 2021-04-26
Payer: MEDICARE

## 2021-04-26 PROCEDURE — 97140 MANUAL THERAPY 1/> REGIONS: CPT

## 2021-04-26 PROCEDURE — 97110 THERAPEUTIC EXERCISES: CPT

## 2021-04-26 PROCEDURE — 97530 THERAPEUTIC ACTIVITIES: CPT

## 2021-04-26 NOTE — PROGRESS NOTES
Notes/Comments   STM manually and with IASTM tools to right wrist and for pain control and tight musculature, concentration on ulnar side for pain relief  X Mild crepitus noted on ulnar side. Patient reports this helped pain. Exercises   Sets/  Sec Reps  Notes/Comments   AROM right and left digits flexion/extension and abduction/adductions 1 10 each X    Blair theraband in sitting for B horizontal abduction, shoulder flexion and diaganols up  15 reps  X           Yellow spring loaded gripper (10#) resistance   22 each X Left hand and right hand   PROM to right wrist flexion/extension and right digits individual and composite flexion/extension. PROM to right digits flexion/extension composite    X    Peach theraband in standing for B rows, shoulder flexion and shoulder extension  15 each X Stood x 4 minutes with 2 rest breaks due to back discomfort   Biodex at 110 RPM, 2 minutes forward and 3 minute backwards   X    Activities Time    Notes/Comments   Discussed adaptive equipment options available for in the bathroom, dressing, writing, eating. Adaptive equipment catalog issued   Discussed compression glove for warmth, comfort and compression for arthritis. Information given for ordering with recommended size   Patient ambulated in clinic with cane 30 feet then 20 feet  X      Specific Interventions Next Treatment: UE strengthening, endurance, HEP, ADLs, adaptive equipment and technique education    Activity/Treatment Tolerance:  [x]  Patient tolerated treatment well  []  Patient limited by fatigue  []  Patient limited by pain   []  Patient limited by other medical complications  []  Other:     Assessment: Patient tolerated treatment well. Patient with limited standing tolerance.     Areas for Improvement: edema, impaired coordination, impaired endurance and impaired strength  Prognosis: fair/good    GOALS:  Patient Goal: Return to driving a car, walk better, and increase strength and endurance,

## 2021-04-26 NOTE — PROGRESS NOTES
[facility]  PHYSICAL THERAPY  [] EVALUATION  [x] DAILY NOTE (LAND) [] DAILY NOTE (AQUATIC ) [] PROGRESS NOTE [] DISCHARGE NOTE    [] [facility]   [] Brenda     [] [address] AKI   [x] Leticia Stoner    Date: 2021  Patient Name:  Rogelio Johnson  : 1945  MRN: [id number]  CSN: [id number]    Referring Practitioner DEB Phillips -*   Diagnosis Gouty Arthropathy    Treatment Diagnosis Difficulty walking; balance dysfunction    Date of Evaluation 21    Additional Pertinent History DM; SOB; HTN      Functional Outcome Measure Used Tinetti   Functional Outcome Score  (21)       Insurance: Primary: Payor: Kiko Clemons /  /  / ,   Secondary:    Authorization Information: Pre-certification not needed    Visit # 7, 7/10 for progress note   Visits Allowed: Based on medical necessity    Recertification Date: May 27, 21   Physician Follow-Up:    Physician Orders:    History of Present Illness: Inga Caceres was recently discharged from [facility] rehab following hospitalization for cellulitis in left LE.      SUBJECTIVE: Having a good morning; working on walking in using his [facility] only     TREATMENT   Precautions:    Pain:10 R wrist    X in shaded column indicates activity completed today   Modalities Parameters/  Location  Notes                     Manual Therapy Time/Technique  Notes   Stretch right hip flexor off edge of mat--long leg traction to stretch  - 5 min  Also quad sets to straigthen knee      STM throughout right foot/ankle; calcaneal distraction with passive stretch into DF; posterior glide of talus  12 min x          Exercise/Intervention   Notes   Discussed HEP provided by rehab staff at [facility]; provided blue band              Nustep  Arms 10 , legs 11  Level 5 7 min x           Dynamic gait in // bars: forward, side stepping, retro; tandem 2 laps  x           Heel toe raises, marching, mini squats 10x      unable to do standing heel raise; on AIREX          Sitting: LAQ, marching, Hip abduction with green band , adductor squeeze 10 x  x    Green t band sitting   PF  10 x       Sit to stand 5x  x B UE support required     Specific Interventions Next Treatment: NuStep; standing balance activities; dynamic gait activities; endurance     Activity/Treatment Tolerance:  [x]  Patient tolerated treatment well  [x]  Patient limited by fatigue  []  Patient limited by pain   []  Patient limited by medical complications  []  Other:     Assessment: Reported increase in pain in his right ankle, especially with sit-stands and squats. Reported greater mobility with less pain following manual therapy throughout his right ankle. Body Structures/Functions/Activity Limitations: impaired activity tolerance, impaired balance, impaired coordination, impaired endurance, impaired ROM, impaired sensation, impaired strength and abnormal gait  Prognosis: fair    GOALS:  Patient Goal: Improve balance and stamina    Short Term Goals:  Time Frame: 3 weeks  1. Williams Martinez will demonstrate a good ankle strategy when maintaining balance with perturbations to his trunk. 2. Williams Martinez will demonstrate \"nose over toes\" positioning with sit-stand transfers without needing verbal cuing. 3. Doug's Tinetti score will improve to 19/28 indicating minimal fall risk. Long Term Goals:  Time Frame: 6 weeks   1. Williams Martinez will transition from walking with [place] to [facility] for greater ease with community ambulation and to return to [facility]. 2. Williams Martinez will be discharged from PT with independent HEP. 3. Doug's Tinetti score will improve to >23/28 indicating no fall risk. 4. Williams Martinez will be able to walk 200 ft at a time without needing a rest break so he is able to walk into the restaurant from his car without fatigue.        Patient Education:   [x]  HEP/Education Completed: at countertop: heel toe raises, marching, mini squats    [employer] Access Code:  []  No new Education completed  []  Reviewed Prior HEP-provided by rehab      [x]  Patient verbalized and/or demonstrated understanding of education provided. []  Patient unable to verbalize and/or demonstrate understanding of education provided. Will continue education. [x]  Barriers to learning: n/a     PLAN:  Treatment Recommendations: Strengthening, Range of Motion, Balance Training, Functional Mobility Training, Endurance Training, Gait Training, Stair Training, Neuromuscular Re-education, Home Exercise Program, Patient Education, Safety Education and Training and Aquatics    []  Plan of care initiated. Plan to see patient 2 times per week for 6 weeks to address the treatment planned outlined above.   [x]  Continue with current plan of care  []  Modify plan of care as follows:    []  Hold pending physician visit  []  Discharge    Time In 0846   Time Out 0925   Timed Code Minutes: 39 min   Total Treatment Time: 39 min       Electronically Signed by: Alta Singh DPT  Anthony Arreola [id number]" Emil Staff, DPT  [id number]

## 2021-04-29 ENCOUNTER — HOSPITAL ENCOUNTER (OUTPATIENT)
Dept: PHYSICAL THERAPY | Age: 76
Setting detail: THERAPIES SERIES
Discharge: HOME | End: 2021-04-29
Payer: MEDICARE

## 2021-04-29 ENCOUNTER — HOSPITAL ENCOUNTER (OUTPATIENT)
Dept: OCCUPATIONAL THERAPY | Age: 76
Setting detail: THERAPIES SERIES
Discharge: HOME | End: 2021-04-29
Payer: MEDICARE

## 2021-04-29 PROCEDURE — 97110 THERAPEUTIC EXERCISES: CPT

## 2021-04-29 PROCEDURE — 97140 MANUAL THERAPY 1/> REGIONS: CPT

## 2021-04-29 NOTE — PROGRESS NOTES
[address] [facility]  PHYSICAL THERAPY  [] EVALUATION  [x] DAILY NOTE (LAND) [] DAILY NOTE (AQUATIC ) [] PROGRESS NOTE [] DISCHARGE NOTE    [] [facility]   [] Brenda Lee    [] [address] AKI   [x] Aramis Paget    Date: 2021  Patient Name:  Domingo Bales  : 1945  MRN: [id number]  CSN: [id number]    Referring Practitioner DEB Barba -*   Diagnosis Gouty Arthropathy    Treatment Diagnosis Difficulty walking; balance dysfunction    Date of Evaluation 21    Additional Pertinent History DM; SOB; HTN      Functional Outcome Measure Used Tinetti   Functional Outcome Score  (21)       Insurance: Primary: Payor: Raeford Apley /  /  / ,   Secondary:    Authorization Information: Pre-certification not needed    Visit # 8, 8/10 for progress note   Visits Allowed: Based on medical necessity    Recertification Date: May 27, 21   Physician Follow-Up:    Physician Orders:    History of Present Illness: Alber Elam was recently discharged from [facility] rehab following hospitalization for cellulitis in left LE. SUBJECTIVE: Pt stated he feels pretty good today. Pt stated he slept well last night. Pt ambulated into clinic with rollator then switched SC during session.     TREATMENT   Precautions:    Pain: denies     X in shaded column indicates activity completed today   Modalities Parameters/  Location  Notes                     Manual Therapy Time/Technique  Notes   Stretch right hip flexor off edge of mat--long leg traction to stretch  - 5 min  Also quad sets to straigthen knee      STM throughout right foot/ankle; calcaneal distraction with passive stretch into DF; posterior glide of talus  10 min x          Exercise/Intervention   Notes   Discussed HEP provided by rehab staff at [facility]; provided blue band              Nustep  Arms 10 , legs 11  Level 5 7 min x           Dynamic gait in // bars: forward, side stepping, retro; tandem 2 laps  x           Heel toe raises, marching, mini squats 10x      unable to do standing heel raise; on AIREX          Sitting: LAQ, marching, Hip abduction with green band , adductor squeeze 15 x  x    Green t band sitting   PF  10 x       Sit to stand 5x  x  no UE support     Specific Interventions Next Treatment: NuStep; standing balance activities; dynamic gait activities; endurance     Activity/Treatment Tolerance:  [x]  Patient tolerated treatment well  [x]  Patient limited by fatigue  []  Patient limited by pain   []  Patient limited by medical complications  []  Other:     Assessment: Pt continues to work on endurance levels with exercises. Increased reps to 15 with sitting exercises. Continued with manual work to R ankle to increase ROM and decreased edema. Body Structures/Functions/Activity Limitations: impaired activity tolerance, impaired balance, impaired coordination, impaired endurance, impaired ROM, impaired sensation, impaired strength and abnormal gait  Prognosis: fair    GOALS:  Patient Goal: Improve balance and stamina    Short Term Goals:  Time Frame: 3 weeks  1. Gian Millan will demonstrate a good ankle strategy when maintaining balance with perturbations to his trunk. 2. Gian Millan will demonstrate \"nose over toes\" positioning with sit-stand transfers without needing verbal cuing. 3. Doug's Tinetti score will improve to 19/28 indicating minimal fall risk. Long Term Goals:  Time Frame: 6 weeks   1. Gian Millan will transition from walking with rollator to [facility] for greater ease with community ambulation and to return to [facility]. 2. Gian Millan will be discharged from PT with independent HEP. 3. Doug's Tinetti score will improve to >23/28 indicating no fall risk. 4. Gian Millan will be able to walk 200 ft at a time without needing a rest break so he is able to walk into the restaurant from his car without fatigue.        Patient Education:   []  HEP/Education Completed: at countertop: heel toe raises, marching, mini squats    [employer] Access Code:  []  No new Education completed  [x]  Reviewed Prior HEP-provided by rehab      [x]  Patient verbalized and/or demonstrated understanding of education provided. []  Patient unable to verbalize and/or demonstrate understanding of education provided. Will continue education. [x]  Barriers to learning: n/a     PLAN:  Treatment Recommendations: Strengthening, Range of Motion, Balance Training, Functional Mobility Training, Endurance Training, Gait Training, Stair Training, Neuromuscular Re-education, Home Exercise Program, Patient Education, Safety Education and Training and Aquatics    []  Plan of care initiated. Plan to see patient 2 times per week for 6 weeks to address the treatment planned outlined above.   [x]  Continue with current plan of care  []  Modify plan of care as follows:    []  Hold pending physician visit  []  Discharge    Time In 0900   Time Out 0940   Timed Code Minutes: 40 min   Total Treatment Time: 40 min       Electronically Signed by: Yao Zhong

## 2021-04-29 NOTE — PROGRESS NOTES
[address] THERAPY  [] EVALUATION  [x] DAILY NOTE (LAND) [] DAILY NOTE (AQUATIC ) [] PROGRESS NOTE [] DISCHARGE NOTE    [] [facility]   [] Silviadriss     [] [address] AKI   [x] Rolly Xiong    Date: 2021  Patient Name:  Alvin Turner  : 1945  MRN: [id number]  CSN: [id number]    Referring Practitioner RUTHIE Arrington   Diagnosis Gout    Treatment Diagnosis Impaired UE strength, impaired endurance, decreased ADLs   Date of Evaluation 21      Functional Outcome Measure Used Functional Impact Questionaire   Functional Outcome Score 68 (21)       Insurance: Primary: Payor: MEDICARE /  /  / ,   Secondary: [facility]   Authorization Information: Unlimited visits based on medical necessity   Visit # 7, 7/10 for progress note   Visits Allowed: Unlimited based on medical necessity   Recertification Date: 3/78/0489   Physician Follow-Up: Not stated   Physician Orders: Script dated 3/25/2021 for OT eval and treat   Pertinent History Patient was admitted to [facility] on 3/12/21 with lower extremity swelling, deconditioned state. Negative for DVT. Patient had inpatient rehab due to deconditioning. PMH includes per medical notes from [facility] includes: Neuropathy, hypertension, hypercholesterolemia, hyperlipidemia, DVT, Lupus, polyarthritis, anticoagulation, asthma, pneumonia, pulmonary embolism, diabetes, hepatitis B, BPH, arthritis, basal ricky carcinoma, anemia, left total hip replacement, spinal fusion,      SUBJECTIVE: Patient pleasant and cooperative. Patient states his arms are tired today.      OBJECTIVE:      TREATMENT   Precautions: fall risk    Pain: 2/10 right wrist     X in shaded column indicates Activity Completed Today   Modalities Parameters/  Location  Notes/Comments                     Manual Therapy Time/  Technique  Notes/Comments   STM manually and with [employer] tools to right wrist and for pain control and tight musculature, concentration on ulnar side for pain relief  X Less crepitus noted. Exercises   Sets/  Sec Reps  Notes/Comments   Scapular retraction 1 10 X    AROM right and left digits flexion/extension and abduction/adductions 1 10 each X    [place] theraband in sitting for B horizontal abduction, shoulder flexion and diaganols up  15 reps  X           Yellow spring loaded gripper (10#) resistance   20 each X Left hand and right hand   PROM to right wrist flexion/extension and right digits individual and composite flexion/extension. PROM to right digits flexion/extension composite    X    Peach theraband in standing for B rows, shoulder flexion and shoulder extension  15 each X No sitting rest breaks. Slight loss of balance with patient able to regain on own. Biodex at 110 RPM, 2 minutes forward and 3 minute backwards   X    Activities Time    Notes/Comments   Discussed adaptive equipment options available for in the bathroom, dressing, writing, eating. Adaptive equipment catalog issued   Discussed compression glove for warmth, comfort and compression for arthritis. Information given for ordering with recommended size   Patient ambulated in clinic with cane 30 feet then 20 feet  X      Specific Interventions Next Treatment: UE strengthening, endurance, HEP, ADLs, adaptive equipment and technique education    Activity/Treatment Tolerance:  [x]  Patient tolerated treatment well  []  Patient limited by fatigue  []  Patient limited by pain   []  Patient limited by other medical complications  []  Other:     Assessment: Patient tolerated treatment well. Patient with limited standing tolerance. Areas for Improvement: edema, impaired coordination, impaired endurance and impaired strength  Prognosis: fair/good    GOALS:  Patient Goal: Return to driving a car, walk better, and increase strength and endurance, mow the lawn.     Short Term Goals:  Time Frame: 5 weeks  1. Patient will be independent with UE HEP for increased ease with eventual driving and car transfers. 2.  Patient will increase stand endurance x 6 minutes during UE functional activity for increased endurance for ambulation to and from car to appointments. 3.  Patient will increase B  strength to 35# for increased ease with opening jars. 4.  Patient will explore adaptive equipment and techniques for increased ease with self care and dressing. Long Term Goals:  Time Frame: 8 weeks  1. Patient will improve UEFS to at least 78.  2.  Patient will increase stand endurance to 8 minutes for increased endurance for ADLs. 3.  Patient will be independent with UE finalized HEP for ease with eventual return to mowing his lawn. Patient Education:   []  HEP/Education Completed: Plan of Care, Goals, adaptive equipment options available for ADLS, IADLs with catalog issued   [address] Access Code for HEP:   []  No new Education completed  [x]  Reviewed Prior HEP      [x]  Patient verbalized and/or demonstrated understanding of education provided. []  Patient unable to verbalize and/or demonstrate understanding of education provided. Will continue education. [x]  Barriers to learning: none    PLAN:  Treatment Recommendations: Strengthening, Endurance Training and Home Exercise Program    []  Plan of care initiated. Plan to see patient 2 times per week for 10 weeks to address the treatment planned outlined above. [x]  Continue with current plan of care  []  Modify plan of care as follows:    []  Hold pending physician visit  []  Discharge    Time In 0944   Time Out 1023   Timed Code Minutes: 39 min   Total Treatment Time: 39 min       Electronically Signed by:  Joel Amaro OTR/L #[id number]

## 2021-05-03 ENCOUNTER — HOSPITAL ENCOUNTER (OUTPATIENT)
Dept: OCCUPATIONAL THERAPY | Age: 76
Setting detail: THERAPIES SERIES
Discharge: HOME | End: 2021-05-03
Payer: MEDICARE

## 2021-05-03 ENCOUNTER — HOSPITAL ENCOUNTER (OUTPATIENT)
Dept: PHYSICAL THERAPY | Age: 76
Setting detail: THERAPIES SERIES
Discharge: HOME | End: 2021-05-03
Payer: MEDICARE

## 2021-05-03 PROCEDURE — 97110 THERAPEUTIC EXERCISES: CPT

## 2021-05-03 PROCEDURE — 97140 MANUAL THERAPY 1/> REGIONS: CPT

## 2021-05-03 NOTE — PROGRESS NOTES
[address]  [] EVALUATION  [x] DAILY NOTE (LAND) [] DAILY NOTE (AQUATIC ) [] PROGRESS NOTE [] DISCHARGE NOTE    [] OUTPATIENT REHABILITATION CENTER [facility]   [] Brenda     [] [address] [facility] [place]   [x] Aramis Paget    Date: 5/3/2021  Patient Name:  Domingo Bales  : 1945  MRN: [id number]  CSN: [id number]    Referring Practitioner RUTHIE Castano   Diagnosis GOUT    Treatment Diagnosis Impaired UE strength, impaired endurance, decreased ADLs   Date of Evaluation 21      Functional Outcome Measure Used Functional Impact Questionaire   Functional Outcome Score 68 (21)       Insurance: Primary: Payor: MEDICARE /  /  / ,   Secondary: [facility]   Authorization Information: Unlimited visits based on medical necessity   Visit # 8, 8/10 for progress note   Visits Allowed: Unlimited based on medical necessity   Recertification Date:    Physician Follow-Up: Not stated   Physician Orders: Script dated 3/25/2021 for OT eval and treat   Pertinent History Patient was admitted to [facility] on 3/12/21 with lower extremity swelling, deconditioned state. Negative for DVT. Patient had inpatient rehab due to deconditioning. PMH includes per medical notes from [facility] includes: Neuropathy, hypertension, hypercholesterolemia, hyperlipidemia, DVT, Lupus, polyarthritis, anticoagulation, asthma, pneumonia, pulmonary embolism, diabetes, hepatitis B, BPH, arthritis, basal ricky carcinoma, anemia, left total hip replacement, spinal fusion,      SUBJECTIVE: Patient pleasant and cooperative. Patient states his fingers are still numb.     OBJECTIVE:      TREATMENT   Precautions: fall risk    Pain: 2/10 right wrist     X in shaded column indicates Activity Completed Today   Modalities Parameters/  Location  Notes/Comments                     Manual Therapy Time/  Technique  Notes/Comments   STM manually and with IASSafetyWeb tools to right wrist and for pain control and tight musculature, concentration on ulnar side for pain relief  X Less crepitus noted. Exercises   Sets/  Sec Reps  Notes/Comments   Scapular retraction 1 10 X    AROM right and left digits flexion/extension and abduction/adductions 1 10 each X    [place] theraband in sitting for B horizontal abduction, shoulder flexion and diaganols up  15 reps  X    Red digit flex B hands 1 10 each X    Yellow spring loaded gripper (10#) resistance   20 each X Left hand and right hand   PROM to right wrist flexion/extension and right digits individual and composite flexion/extension. PROM to right digits flexion/extension composite    X    [place] theraband in standing for B rows, shoulder flexion and shoulder extension  15 each X One sitting rest breaks. Patient reports back pain makes him need to sit for a rest break. Biodex at 110 RPM, 2 minutes forward and 3 minute backwards   X    Activities Time    Notes/Comments   Discussed adaptive equipment options available for in the bathroom, dressing, writing, eating. Adaptive equipment catalog issued   Discussed compression glove for warmth, comfort and compression for arthritis. Information given for ordering with recommended size   Patient ambulated in clinic with cane 30 feet then 20 feet        Specific Interventions Next Treatment: UE strengthening, endurance, HEP, ADLs, adaptive equipment and technique education    Activity/Treatment Tolerance:  [x]  Patient tolerated treatment well  []  Patient limited by fatigue  []  Patient limited by pain   []  Patient limited by other medical complications  []  Other:     Assessment: Patient tolerated treatment well. Patient with limited standing tolerance due to back pain.    Areas for Improvement: edema, impaired coordination, impaired endurance and impaired strength  Prognosis: fair/good    GOALS:  Patient Goal: Return to driving a car, walk better, and increase strength and

## 2021-05-03 NOTE — PROGRESS NOTES
[address] [facility]  PHYSICAL THERAPY  [] EVALUATION  [x] DAILY NOTE (LAND) [] DAILY NOTE (AQUATIC ) [] PROGRESS NOTE [] DISCHARGE NOTE    [] [facility]   [] Brenda [address]    [] [address] [facility]   [x] [address] [facility]    Date: 5/3/2021  Patient Name:  Esdras Montiel  : 1945  MRN: [id number]  CSN: [id number]    Referring Practitioner DEB Rivas -*   Diagnosis Gouty Arthropathy    Treatment Diagnosis Difficulty walking; balance dysfunction    Date of Evaluation 21    Additional Pertinent History DM; SOB; HTN      Functional Outcome Measure Used Tinetti   Functional Outcome Score  (21)       Insurance: Primary: Payor: Radha Abdi /  /  / ,   Secondary:    Authorization Information: Pre-certification not needed    Visit # 8, 8/10 for progress note   Visits Allowed: Based on medical necessity    Recertification Date: May 27, 21   Physician Follow-Up:    Physician Orders:    History of Present Illness: Bernardo Hernandez was recently discharged from [facility] rehab following hospitalization for cellulitis in left LE. SUBJECTIVE: Had to walk quite a ways in from parking lot. No new bumps or bruises.     TREATMENT   Precautions:    Pain: denies     X in shaded column indicates activity completed today   Modalities Parameters/  Location  Notes                     Manual Therapy Time/Technique  Notes   Stretch right hip flexor off edge of mat--long leg traction to stretch  - 5 min  Also quad sets to straigthen knee      STM throughout left foot/ankle; calcaneal distraction with passive stretch into DF; posterior glide of talus  25 min x          Exercise/Intervention   Notes   Discussed HEP provided by rehab staff at [facility]; provided blue band              Nustep  Arms 10 , legs 11  Level 5 7 min x           Dynamic gait in // bars: forward, side stepping, retro; tandem 2 laps  x Unable to complete all laps due to fatigue          Heel toe raises, marching, mini squats 10x      unable to do standing heel raise; on AIREX          Sitting: LAQ, marching, Hip abduction with green band , adductor squeeze 15 x  x    Green t band sitting   PF  10 x       Sit to stand 5x    no UE support     Specific Interventions Next Treatment: NuStep; standing balance activities; dynamic gait activities; endurance     Activity/Treatment Tolerance:  [x]  Patient tolerated treatment well  [x]  Patient limited by fatigue  []  Patient limited by pain   []  Patient limited by medical complications  []  Other:     Assessment: Sabrina Redman struggled with standing/walking exercises today; likely due to long walk, uphill coming into the clinic. Worked on left ankle ROM to facilitate improved heel/toe with walking. Body Structures/Functions/Activity Limitations: impaired activity tolerance, impaired balance, impaired coordination, impaired endurance, impaired ROM, impaired sensation, impaired strength and abnormal gait  Prognosis: fair    GOALS:  Patient Goal: Improve balance and stamina    Short Term Goals:  Time Frame: 3 weeks  1. Sabrina Redman will demonstrate a good ankle strategy when maintaining balance with perturbations to his trunk. 2. Sabrina Redman will demonstrate \"nose over toes\" positioning with sit-stand transfers without needing verbal cuing. 3. Doug's Tinetti score will improve to 19/28 indicating minimal fall risk. Long Term Goals:  Time Frame: 6 weeks   1. Sabrina Redman will transition from walking with rollator to [facility] for greater ease with community ambulation and to return to [facility]. 2. Sabrina Redman will be discharged from PT with independent HEP. 3. Doug's Tinetti score will improve to >23/28 indicating no fall risk. 4. Sabrina Redman will be able to walk 200 ft at a time without needing a rest break so he is able to walk into the restaurant from his car without fatigue.        Patient Education:   []  HEP/Education Completed: at countertop: heel toe raises, marching, mini [facility] Access Code:  []  No new Education completed  [x]  Reviewed Prior HEP-provided by rehab      [x]  Patient verbalized and/or demonstrated understanding of education provided. []  Patient unable to verbalize and/or demonstrate understanding of education provided. Will continue education. [x]  Barriers to learning: n/a     PLAN:  Treatment Recommendations: Strengthening, Range of Motion, Balance Training, Functional Mobility Training, Endurance Training, Gait Training, Stair Training, Neuromuscular Re-education, Home Exercise Program, Patient Education, Safety Education and Training and Aquatics    []  Plan of care initiated. Plan to see patient 2 times per week for 6 weeks to address the treatment planned outlined above.   [x]  Continue with current plan of care  []  Modify plan of care as follows:    []  Hold pending physician visit  []  Discharge    Time In 0900   Time Out 0940   Timed Code Minutes: 40 min   Total Treatment Time: 40 min       Electronically Signed by: Eli Arizmendi \"Gay\" Tha Cruz DPT  [id number]

## 2021-05-06 ENCOUNTER — HOSPITAL ENCOUNTER (OUTPATIENT)
Dept: PHYSICAL THERAPY | Age: 76
Setting detail: THERAPIES SERIES
Discharge: HOME | End: 2021-05-06
Payer: MEDICARE

## 2021-05-06 ENCOUNTER — HOSPITAL ENCOUNTER (OUTPATIENT)
Dept: OCCUPATIONAL THERAPY | Age: 76
Setting detail: THERAPIES SERIES
Discharge: HOME | End: 2021-05-06
Payer: MEDICARE

## 2021-05-06 PROCEDURE — 97530 THERAPEUTIC ACTIVITIES: CPT

## 2021-05-06 PROCEDURE — 97140 MANUAL THERAPY 1/> REGIONS: CPT

## 2021-05-06 PROCEDURE — 97110 THERAPEUTIC EXERCISES: CPT

## 2021-05-06 NOTE — PROGRESS NOTES
[address] THER[address]Y  [] EVALUATION  [x] DAILY NOTE (LAND) [] DAILY NOTE (AQUATIC ) [] PROGRESS NOTE [] DISCHARGE NOTE    [] OUT[facility] [place]   [] Leon[place]aret     [] [address] [facility]   [x] Tamar Villa    Date: 2021  Patient Name:  Kena Eller  : 1945  MRN: [id number]  CSN: [id number]    Referring Practitioner RUTHIE Alcantara   Diagnosis GOUT    Treatment Diagnosis Impaired UE strength, impaired endurance, decreased ADLs   Date of Evaluation 21      Functional Outcome Measure Used Functional Impact Questionaire   Functional Outcome Score 68 (21)       Insurance: Primary: Payor: MEDICARE /  /  / ,   Secondary: [facility]   Authorization Information: Unlimited visits based on medical necessity   Visit # 9, 9/10 for progress note   Visits Allowed: Unlimited based on medical necessity   Recertification Date: 2754   Physician Follow-Up: Not stated   Physician Orders: Script dated 3/25/2021 for OT eval and treat   Pertinent History Patient was admitted to [facility] on 3/12/21 with lower extremity swelling, deconditioned state. Negative for DVT. Patient had inpatient rehab due to deconditioning. PMH includes per medical notes from [facility] includes: Neuropathy, hypertension, hypercholesterolemia, hyperlipidemia, DVT, Lupus, polyarthritis, anticoagulation, asthma, pneumonia, pulmonary embolism, diabetes, hepatitis B, BPH, arthritis, basal ricky carcinoma, anemia, left total hip replacement, spinal fusion,      SUBJECTIVE: Patient states he wears the gloves at night and they seem to help.      OBJECTIVE:      TREATMENT   Precautions: fall risk    Pain: 1/10 right wrist     X in shaded column indicates Activity Completed Today   Modalities Parameters/  Location  Notes/Comments                     Manual Therapy Time/  Technique  Notes/Comments   STM manually and with I[employer]sonix tools to right wrist and for pain control and tight musculature, concentration on ulnar side for pain relief  X Some crepitus noted at ulnar wrist.                Exercises   Sets/  Sec Reps  Notes/Comments   Scapular retraction 1 15 X    AROM right and left digits flexion/extension and abduction/adductions 1 10 each X    [place] theraband in sitting for B horizontal abduction, shoulder flexion and diaganols up  20 reps  X    Red digit flex B hands 1 10 each X    Yellow spring loaded gripper (10#) resistance   20 each X Left hand and right hand   PROM to right and left wrist flexion/extension and right and left digits individual and composite flexion/extension. X    [place] theraband in standing for B rows, shoulder flexion and shoulder extension  15 each X One sitting rest breaks. Patient reports back pain makes him need to sit for a rest break. Pulleys for B shoulder flexion  15 X                  Biodex at 105 RPM, 2 minutes forward and 3 minute backwards   X    Activities Time    Notes/Comments   Discussed adaptive equipment options available for in the bathroom, dressing, writing, eating. Adaptive equipment catalog issued   Discussed compression glove for warmth, comfort and compression for arthritis. Information given for ordering with recommended size   Patient ambulated in clinic with cane 30 feet then 20 feet        Specific Interventions Next Treatment: UE strengthening, endurance, HEP, ADLs, adaptive equipment and technique education    Activity/Treatment Tolerance:  [x]  Patient tolerated treatment well  []  Patient limited by fatigue  []  Patient limited by pain   []  Patient limited by other medical complications  []  Other:     Assessment: Patient tolerated treatment well. Patient reports some fatigue today.    Areas for Improvement: edema, impaired coordination, impaired endurance and impaired strength  Prognosis: fair/good    GOALS:  Patient Goal: Return to driving a car, walk better, and increase strength and endurance, mow the lawn. Short Term Goals:  Time Frame: 5 weeks  1. Patient will be independent with UE HEP for increased ease with eventual driving and car transfers. 2.  Patient will increase stand endurance x 6 minutes during UE functional activity for increased endurance for ambulation to and from car to appointments. 3.  Patient will increase B  strength to 35# for increased ease with opening jars. 4.  Patient will explore adaptive equipment and techniques for increased ease with self care and dressing. Long Term Goals:  Time Frame: 8 weeks  1. Patient will improve UEFS to at least 78.  2.  Patient will increase stand endurance to 8 minutes for increased endurance for ADLs. 3.  Patient will be independent with UE finalized HEP for ease with eventual return to mowing his lawn. Patient Education:   []  HEP/Education Completed: Plan of Care, Goals, adaptive equipment options available for ADLS, IADLs with catalog issued   [address] Access Code for HEP:   []  No new Education completed  [x]  Reviewed Prior HEP      [x]  Patient verbalized and/or demonstrated understanding of education provided. []  Patient unable to verbalize and/or demonstrate understanding of education provided. Will continue education. [x]  Barriers to learning: none    PLAN:  Treatment Recommendations: Strengthening, Endurance Training and Home Exercise Program    []  Plan of care initiated. Plan to see patient 2 times per week for 10 weeks to address the treatment planned outlined above. [x]  Continue with current plan of care  []  Modify plan of care as follows:    []  Hold pending physician visit  []  Discharge    Time In 0924   Time Out 1008   Timed Code Minutes: 44 min   Total Treatment Time: 44 min       Electronically Signed by:  Micheline Cogan OTR/L #[id number]

## 2021-05-06 NOTE — PROGRESS NOTES
[address] [facility]  PHYSICAL THERAPY  [] EVALUATION  [x] DAILY NOTE (LAND) [] DAILY NOTE (AQUATIC ) [] PROGRESS NOTE [] DISCHARGE NOTE    [] [address] [facility]   [] Brenda     [] [address] [facility]   [x] Leticia Stoner    Date: 2021  Patient Name:  Rogelio Johnson  : 1945  MRN: [id number]  CSN: [id number]    Referring Practitioner DEB Phillips -*   Diagnosis Gouty Arthropathy    Treatment Diagnosis Difficulty walking; balance dysfunction    Date of Evaluation 21    Additional Pertinent History DM; SOB; HTN      Functional Outcome Measure Used Tinetti   Functional Outcome Score  (21)       Insurance: Primary: Payor: Kiko Clemons /  /  / ,   Secondary:    Authorization Information: Pre-certification not needed    Visit # 9, 10 for progress note   Visits Allowed: Based on medical necessity    Recertification Date: May 27, 21   Physician Follow-Up:    Physician Orders:    History of Present Illness: Inga Caceres was recently discharged from [facility] [place] rehab following hospitalization for cellulitis in left LE.      SUBJECTIVE: Reports walking is getting better    TREATMENT   Precautions:    Pain: denies     X in shaded column indicates activity completed today   Modalities Parameters/  Location  Notes                     Manual Therapy Time/Technique  Notes   Stretch right hip flexor off edge of mat--long leg traction to stretch  - 5 min  Also quad sets to straigthen knee      STM throughout left foot/ankle; calcaneal distraction with passive stretch into DF; posterior glide of talus  25 min x          Exercise/Intervention   Notes   Discussed HEP provided by rehab staff at [facility] [place]; provided blue band              Nustep  Arms 10 , legs 11  Level 5 7 min x           Dynamic gait in // bars: forward, side stepping, retro; tandem 2 laps  x Unable to complete all laps due to fatigue          Heel toe raises, Code:  []  No new Education completed  [x]  Reviewed Prior HEP-provided by rehab      [x]  Patient verbalized and/or demonstrated understanding of education provided. []  Patient unable to verbalize and/or demonstrate understanding of education provided. Will continue education. [x]  Barriers to learning: n/a     PLAN:  Treatment Recommendations: Strengthening, Range of Motion, Balance Training, Functional Mobility Training, Endurance Training, Gait Training, Stair Training, Neuromuscular Re-education, Home Exercise Program, Patient Education, Safety Education and Training and Aquatics    []  Plan of care initiated. Plan to see patient 2 times per week for 6 weeks to address the treatment planned outlined above.   [x]  Continue with current plan of care  []  Modify plan of care as follows:    []  Hold pending physician visit  []  Discharge    Time In 0845   Time Out 0920   Timed Code Minutes: 40 min   Total Treatment Time: 40 min       Electronically Signed by: Gab Acevedo

## 2021-05-10 ENCOUNTER — HOSPITAL ENCOUNTER (OUTPATIENT)
Dept: PHYSICAL THERAPY | Age: 76
Setting detail: THERAPIES SERIES
End: 2021-05-10
Payer: MEDICARE

## 2021-05-10 ENCOUNTER — HOSPITAL ENCOUNTER (OUTPATIENT)
Dept: OCCUPATIONAL THERAPY | Age: 76
Setting detail: THERAPIES SERIES
Discharge: HOME | End: 2021-05-10
Payer: MEDICARE

## 2021-05-10 ENCOUNTER — HOSPITAL ENCOUNTER (OUTPATIENT)
Dept: OCCUPATIONAL THERAPY | Age: 76
Setting detail: THERAPIES SERIES
End: 2021-05-10
Payer: MEDICARE

## 2021-05-10 NOTE — PROGRESS NOTES
[address] THERAPY  [] EVALUATION  [x] DAILY NOTE (LAND) [] DAILY NOTE (AQUATIC ) [] PROGRESS NOTE [] DISCHARGE NOTE    [] [address] [facility]   [] Brenda [address]    [] [address] [facility]   [x] Serenity Hernandez    Date: 5/10/2021  Patient Name:  Isael Macias  : 1945  MRN: [id number]  CSN: [id number]    Referring Practitioner No ref. provider found   Diagnosis No admission diagnoses are documented for this encounter. Treatment Diagnosis Impaired UE strength, impaired endurance, decreased ADLs   Date of Evaluation 21      Functional Outcome Measure Used Functional Impact Questionaire   Functional Outcome Score 68 (21)       Insurance: Primary: Payor: Amy Logan /  /  / ,   Secondary: Jessica   Authorization Information: Unlimited visits based on medical necessity   Visit # 9, 910 for progress note   Visits Allowed: Unlimited based on medical necessity   Recertification Date: 3491   Physician Follow-Up: Not stated   Physician Orders: Script dated 3/25/2021 for OT eval and treat   Pertinent History Patient was admitted to [facility] on 3/12/21 with lower extremity swelling, deconditioned state. Negative for DVT. Patient had inpatient rehab due to deconditioning. PMH includes per medical notes from [facility] includes: Neuropathy, hypertension, hypercholesterolemia, hyperlipidemia, DVT, Lupus, polyarthritis, anticoagulation, asthma, pneumonia, pulmonary embolism, diabetes, hepatitis B, BPH, arthritis, basal ricky carcinoma, anemia, left total hip replacement, spinal fusion,      SUBJECTIVE: Patient states he wears the gloves at night and they seem to help.      OBJECTIVE:      TREATMENT   Precautions: fall risk    Pain: 1/10 right wrist     X in shaded column indicates Activity Completed Today   Modalities Parameters/  Location  Notes/Comments                     Manual Therapy Time/  Technique Return to driving a car, walk better, and increase strength and endurance, mow the lawn. Short Term Goals:  Time Frame: 5 weeks  1. Patient will be independent with UE HEP for increased ease with eventual driving and car transfers. 2.  Patient will increase stand endurance x 6 minutes during UE functional activity for increased endurance for ambulation to and from car to appointments. 3.  Patient will increase B  strength to 35# for increased ease with opening jars. 4.  Patient will explore adaptive equipment and techniques for increased ease with self care and dressing. Long Term Goals:  Time Frame: 8 weeks  1. Patient will improve UEFS to at least 78.  2.  Patient will increase stand endurance to 8 minutes for increased endurance for ADLs. 3.  Patient will be independent with UE finalized HEP for ease with eventual return to mowing his lawn. Patient Education:   []  HEP/Education Completed: Plan of Care, Goals, adaptive equipment options available for ADLS, IADLs with catalog issued   [address] Access Code for HEP:   []  No new Education completed  [x]  Reviewed Prior HEP      [x]  Patient verbalized and/or demonstrated understanding of education provided. []  Patient unable to verbalize and/or demonstrate understanding of education provided. Will continue education. [x]  Barriers to learning: none    PLAN:  Treatment Recommendations: Strengthening, Endurance Training and Home Exercise Program    []  Plan of care initiated. Plan to see patient 2 times per week for 10 weeks to address the treatment planned outlined above. [x]  Continue with current plan of care  []  Modify plan of care as follows:    []  Hold pending physician visit  []  Discharge    Time In 0924   Time Out 1008   Timed Code Minutes: 44 min   Total Treatment Time: 44 min       Electronically Signed by:  Jaime STOKES/L #[id number]

## 2021-05-13 ENCOUNTER — HOSPITAL ENCOUNTER (OUTPATIENT)
Dept: OCCUPATIONAL THERAPY | Age: 76
Setting detail: THERAPIES SERIES
Discharge: HOME | End: 2021-05-13
Payer: MEDICARE

## 2021-05-13 ENCOUNTER — HOSPITAL ENCOUNTER (OUTPATIENT)
Dept: PHYSICAL THERAPY | Age: 76
Setting detail: THERAPIES SERIES
Discharge: HOME | End: 2021-05-13
Payer: MEDICARE

## 2021-05-13 PROCEDURE — 97530 THERAPEUTIC ACTIVITIES: CPT

## 2021-05-13 PROCEDURE — 97110 THERAPEUTIC EXERCISES: CPT

## 2021-05-13 PROCEDURE — 97140 MANUAL THERAPY 1/> REGIONS: CPT

## 2021-05-13 NOTE — PROGRESS NOTES
[address] [facility]  PHYSICAL THERAPY  [] EVALUATION  [] DAILY NOTE (LAND) [] DAILY NOTE (AQUATIC ) [x] PROGRESS NOTE [] DISCHARGE NOTE    [] [facility] [place]   [] Brenda Lee    [] [address] AKI   [x] Leticia Stoner    Date: 2021  Patient Name:  Rogelio Johnson  : 1945  MRN: [id number]  CSN: [id number]    Referring Practitioner DBE Phillips -*   Diagnosis Gouty Arthropathy    Treatment Diagnosis Difficulty walking; balance dysfunction    Date of Evaluation 21    Additional Pertinent History DM; SOB; HTN      Functional Outcome Measure Used Tinetti   Functional Outcome Score  (21)    (21)      Insurance: Primary: [facility] [address] /  /  / ,   Secondary:    Authorization Information: Pre-certification not needed    Visit # 10, 0/10 for progress note   Visits Allowed: Based on medical necessity    Recertification Date: May 27, 21   Physician Follow-Up:    Physician Orders:    History of Present Illness: Inga Caceres was recently discharged from [facility] rehab following hospitalization for cellulitis in left LE. SUBJECTIVE: Definitely noticed a difference having not been seen earlier this week.  Struggles with walking in the grass     TREATMENT   Precautions:    Pain: denies     X in shaded column indicates activity completed today   Modalities Parameters/  Location  Notes                     Manual Therapy Time/Technique  Notes   Stretch right hip flexor off edge of mat--long leg traction to stretch  - 5 min  Also quad sets to straigthen knee      STM throughout left foot/ankle; calcaneal distraction with passive stretch into DF; posterior glide of talus  15 min x          Exercise/Intervention   Notes   Discussed HEP provided by rehab staff at [facility]; provided blue band              Nustep  Arms 10 , legs 11  Level 5 7 min x           Dynamic gait in // bars: forward, side stepping, retro; tandem 2 laps  x           Heel toe raises, marching, mini squats 10x   x   unable to do standing heel raise; on AIREX          Sitting: LAQ, marching, Hip abduction with green band , adductor squeeze 15 x      Green t band sitting   PF  10 x       Sit to stand 5x  x  no UE support                 Review of goals; Tinetti   x      Specific Interventions Next Treatment: NuStep; standing balance activities; dynamic gait activities; endurance     Activity/Treatment Tolerance:  [x]  Patient tolerated treatment well  [x]  Patient limited by fatigue  []  Patient limited by pain   []  Patient limited by medical complications  []  Other:     Assessment: Since starting therapy, Antonia Dennis has returned to walking with his [facility] and is feeling more confident with his general mobility. He will continue to benefit from PT to work to improve dynamic balance and gait program so he is able to walk across grass with greater confidence. Body Structures/Functions/Activity Limitations: impaired activity tolerance, impaired balance, impaired coordination, impaired endurance, impaired ROM, impaired sensation, impaired strength and abnormal gait  Prognosis: fair    GOALS:  Patient Goal: Improve balance and stamina    Short Term Goals:  Time Frame: 3 weeks  1. Antonia Dennis will demonstrate a good ankle strategy when maintaining balance with perturbations to his trunk. GOAL MET   2. Antonia Dennis will demonstrate \"nose over toes\" positioning with sit-stand transfers without needing verbal cuing. GOAL MET   3. Doug's Tinetti score will improve to 19/28 indicating minimal fall risk. NOT MET: improved to 17/28      Long Term Goals:  Time Frame: 6 weeks   1. Antonia Dennis will transition from walking with rollator to [facility] for greater ease with community ambulation and to return to [facility]. GOAL MET   2. Antonia Dennis will be discharged from PT with independent HEP. ONGOING  3. Doug's Tinetti score will improve to >23/28 indicating no fall risk.  NOT MET  4. Antonia Dennis will be able to walk 200 ft at a time without needing a rest break so he is able to walk into the restaurant from his car without fatigue. ONGOING  5. NEW GOAL: Sabrina Redman will be able to transition from concrete to grassy surfaces with greater confidence and without loss of balance so he may walk throughout his property without LOB. Patient Education:   []  HEP/Education Completed: at countertop: heel toe raises, marching, mini squats    [employer] Access Code:  []  No new Education completed  [x]  Reviewed Prior HEP-provided by rehab      [x]  Patient verbalized and/or demonstrated understanding of education provided. []  Patient unable to verbalize and/or demonstrate understanding of education provided. Will continue education. [x]  Barriers to learning: n/a     PLAN:  Treatment Recommendations: Strengthening, Range of Motion, Balance Training, Functional Mobility Training, Endurance Training, Gait Training, Stair Training, Neuromuscular Re-education, Home Exercise Program, Patient Education, Safety Education and Training and Aquatics    []  Plan of care initiated. Plan to see patient 2 times per week for 6 weeks to address the treatment planned outlined above.   [x]  Continue with current plan of care  []  Modify plan of care as follows:    []  Hold pending physician visit  []  Discharge    Time In 96 [phone] 26   Time Out 0948   Timed Code Minutes: 43 min   Total Treatment Time: 43 min       Electronically Signed by: Lenin Parson \"Gay\" Tammy Hearn DPT  [id number]

## 2021-05-13 NOTE — PROGRESS NOTES
** PLEASE SIGN, DATE AND TIME CERTIFICATION BELOW AND RETURN TO [facility] OUTPATIENT REHABILITATION (FAX #: [phone]). ATTEST/CO-SIGN IF ACCESSING VIA [employer]. THANK YOU.**    I certify that I have examined the patient below and determined that Physical Medicine and Rehabilitation service is necessary and that I approve the established plan of care for up to 90 days or as specifically noted. Attestation, signature or co-signature of physician indicates approval of certification requirements.    ________________________ ____________ __________  Physician Signature   Date   Time     [address] THERAPY  [] EVALUATION  [] DAILY NOTE (LAND) [] DAILY NOTE (AQUATIC ) [x] PROGRESS NOTE [] DISCHARGE NOTE    [] [address] [facility]   [] [facility] 90    [] [facility]   [x] Tahir Stoner    Date: 2021  Patient Name:  Antonia Watters  : 1945  MRN: [id number]  CSN: [id number]    Referring Practitioner Asuncion BOYD   Diagnosis GOUT    Treatment Diagnosis Impaired UE strength, impaired endurance, decreased ADLs   Date of Evaluation 21      Functional Outcome Measure Used Functional Impact Questionaire   Functional Outcome Score 68 (21)  38  21       Insurance: Primary: Payor: Donovan Veliz /  /  / ,   Secondary: [facility]   Authorization Information: Unlimited visits based on medical necessity   Visit # 10, 10/10 for progress note   Visits Allowed: Unlimited based on medical necessity   Recertification Date: 6385   Physician Follow-Up: Not stated   Physician Orders: Script dated 3/25/2021 for OT eval and treat   Pertinent History Patient was admitted to [facility] on 3/12/21 with lower extremity swelling, deconditioned state. Negative for DVT. Patient had inpatient rehab due to deconditioning.   PMH includes per medical notes from [facility] includes: Neuropathy, hypertension, hypercholesterolemia, hyperlipidemia, DVT, Lupus, polyarthritis, anticoagulation, asthma, pneumonia, pulmonary embolism, diabetes, hepatitis B, BPH, arthritis, basal ricky carcinoma, anemia, left total hip replacement, spinal fusion,      SUBJECTIVE: Patient states he wears the gloves at night and they seem to help. \"Therapy is helping ,but, we have more work to do on my hands. \"Right wrist swelling is better and staying decreased. FOM questions were answered with more accuracy today patient reports. OBJECTIVE:      TREATMENT   Precautions: fall risk    Pain: 1/10 right wrist     X in shaded column indicates Activity Completed Today   Modalities Parameters/  Location  Notes/Comments                     Manual Therapy Time/  Technique  Notes/Comments   STM manually and with IASTM tools to right wrist and for pain control and tight musculature, concentration on ulnar side for pain relief   Some crepitus noted at ulnar wrist.                Exercises   Sets/  Sec Reps  Notes/Comments   Scapular retraction 1 15 X    AROM right and left digits flexion/extension and abduction/adductions 1 10 each X    [place] theraband in sitting for B horizontal abduction, shoulder flexion and diaganols up  20 reps      Red digit  Pink sponge for bilateral light gripping and lateral pinching  1    1 10 each    10 each motion      x     Cues given to respect pain during activity   Yellow spring loaded gripper (10#) resistance   20 each  Left hand and right hand   PROM to right and left wrist flexion/extension and right and left digits individual and composite flexion/extension. Bilateral thumb all motions  completed to tolerance    X      X    [place] theraband in standing for B rows, shoulder flexion and shoulder extension  15 each  One sitting rest breaks. Patient reports back pain makes him need to sit for a rest break.      Pulleys for B shoulder flexion  15                   Biodex at 105 RPM, 2 minutes forward and 3 minute backwards X    Activities Time    Notes/Comments   Discussed adaptive equipment options available for in the bathroom, dressing, writing, eating. Adaptive equipment catalog issued   Discussed compression glove for warmth, comfort and compression for arthritis. Information given for ordering with recommended size   Patient ambulated in clinic with cane 30 feet then 20 feet        Specific Interventions Next Treatment: UE strengthening, endurance, HEP, ADLs, adaptive equipment and technique education    Activity/Treatment Tolerance:  [x]  Patient tolerated treatment well  []  Patient limited by fatigue  []  Patient limited by pain   []  Patient limited by other medical complications  []  Other:     Assessment: Patient tolerated treatment well and goal assessment well. He is slowly meeting goals with pain and distal UE edma decreasing. Lateral pinch right  11 pounds left 7 pounds. oam applied for built up penstandard. Hardest thing for me is putting gas in mower. Mowing with frequent rest periods for hands and back care . Gloves are helping with bilateral hand edema and vibratory gloves for help with mowing task. Areas for Improvement: edema, impaired coordination, impaired endurance and impaired strength  Prognosis: fair/good    GOALS:  Patient Goal: Return to driving a car, walk better, and increase strength and endurance, mow the lawn. Short Term Goals:  Time Frame: 5 weeks  1. Patient will be independent with UE HEP for increased ease with eventual driving and car transfers. GOAL MET Revised goal Patient will be independent with upgraded HEP for increase ease with dressing . 2.  Patient will increase stand endurance x 6 minutes during UE functional activity for increased endurance for ambulation to and from car to appointments NOT MET 4/10. CONTINUE GOAL     3. Patient will increase B  strength to 35# for increased ease with opening jars. NOT MET Right 28 pounds, ,left 27 pounds CONTINUE GOAL   4.   Patient will explore adaptive equipment and techniques for increased ease with self care and dressing. Using  it material to open water bottles. Patient was issued cylinder foam adapted standard pen. Adaptive anti vibratory gloves are used and helping with advanced ADL's . PART MET CONTINUE    NEW GOAL # 5 Increase right lateral pinch to 14 # and left to 19 pounds for increase ease opening packages and picking up pills . Long Term Goals:  Time Frame: 8 weeks  1. Patient will improve UEFS to at least 78. NOT MET 38  2. Patient will increase stand endurance to 8 minutes for increased endurance for ADLs. NOT MET CONTINUE   3. Patient will be independent with UE finalized HEP for ease with eventual return to mowing his lawn. NOT MET CONTINUE     Patient Education:   []  HEP/Education Completed: Plan of Care, Goals, adaptive equipment options available for ADLS, IADLs with catalog issued   [employer] Access Code for HEP:   [x]   new Education completed with pink sponge issued for  and lateral pinch strength  [x]  Reviewed Prior HEP      [x]  Patient verbalized and/or demonstrated understanding of education provided. []  Patient unable to verbalize and/or demonstrate understanding of education provided. Will continue education. [x]  Barriers to learning: none    PLAN:  Treatment Recommendations: Strengthening, Endurance Training and Home Exercise Program    []  Plan of care initiated. Plan to see patient 2 times per week for 4 more weeks to address the treatment planned outlined above. [x]  Continue with current plan of care  []  Modify plan of care as follows:    []  Hold pending physician visit  []  Discharge    Time In 0950   Time Out 1040   Timed Code Minutes: 55min   Total Treatment Time: 55 min     P. [facility] ALFRED/L 111

## 2021-05-19 ENCOUNTER — HOSPITAL ENCOUNTER (OUTPATIENT)
Dept: PHYSICAL THERAPY | Age: 76
Setting detail: THERAPIES SERIES
Discharge: HOME | End: 2021-05-19
Payer: MEDICARE

## 2021-05-19 ENCOUNTER — HOSPITAL ENCOUNTER (OUTPATIENT)
Dept: OCCUPATIONAL THERAPY | Age: 76
Setting detail: THERAPIES SERIES
Discharge: HOME | End: 2021-05-19
Payer: MEDICARE

## 2021-05-19 PROCEDURE — 97110 THERAPEUTIC EXERCISES: CPT

## 2021-05-19 PROCEDURE — 97140 MANUAL THERAPY 1/> REGIONS: CPT

## 2021-05-19 NOTE — PROGRESS NOTES
[address] THERAPY  [] EVALUATION  [x] DAILY NOTE (LAND) [] DAILY NOTE (AQUATIC ) [] PROGRESS NOTE [] DISCHARGE NOTE    [] [facility]   [] Brenda [address]    [] [address] [employer]   [x] Efren Tabor    Date: 2021  Patient Name:  Keyana Saab  : 1945  MRN: [id number]  CSN: [id number]    Referring Practitioner Joselo BOYD   Diagnosis GOUT    Treatment Diagnosis Impaired UE strength, impaired endurance, decreased ADLs   Date of Evaluation 21      Functional Outcome Measure Used Functional Impact Questionaire   Functional Outcome Score 68 (21)  38  21       Insurance: Primary: Payor: Radha Amaya /  /  / ,   Secondary: [facility]   Authorization Information: Unlimited visits based on medical necessity   Visit # 11, 1/10 for progress note   Visits Allowed: Unlimited based on medical necessity   Recertification Date:    Physician Follow-Up: Not stated   Physician Orders: Script dated 3/25/2021 for OT eval and treat   Pertinent History Patient was admitted to [facility] on 3/12/21 with lower extremity swelling, deconditioned state. Negative for DVT. Patient had inpatient rehab due to deconditioning. PMH includes per medical notes from [facility] includes: Neuropathy, hypertension, hypercholesterolemia, hyperlipidemia, DVT, Lupus, polyarthritis, anticoagulation, asthma, pneumonia, pulmonary embolism, diabetes, hepatitis B, BPH, arthritis, basal ricky carcinoma, anemia, left total hip replacement, spinal fusion,      SUBJECTIVE: Patient states he is doing the mowing with rest breaks. Patient states it takes him 5 hours to mow his lawn.       OBJECTIVE:      TREATMENT   Precautions: fall risk    Pain: 2/10 right and left wrists     X in shaded column indicates Activity Completed Today   Modalities Parameters/  Location  Notes/Comments                     Manual Therapy better, and increase strength and endurance, mow the lawn. Short Term Goals:  Time Frame: 5 weeks  1. Patient will be independent with UE HEP for increased ease with eventual driving and car transfers. GOAL MET Revised goal Patient will be independent with upgraded HEP for increase ease with dressing . 2.  Patient will increase stand endurance x 6 minutes during UE functional activity for increased endurance for ambulation to and from car to appointments NOT MET 4/10. CONTINUE GOAL     3. Patient will increase B  strength to 35# for increased ease with opening jars. NOT MET Right 28 pounds, ,left 27 pounds CONTINUE GOAL   4. Patient will explore adaptive equipment and techniques for increased ease with self care and dressing. Using  it material to open water bottles. Patient was issued cylinder foam adapted standard pen. Adaptive anti vibratory gloves are used and helping with advanced ADL's . PART MET CONTINUE    NEW GOAL # 5 Increase right lateral pinch to 14 # and left to 19 pounds for increase ease opening packages and picking up pills . Long Term Goals:  Time Frame: 8 weeks  1. Patient will improve UEFS to at least 78. NOT MET 38  2. Patient will increase stand endurance to 8 minutes for increased endurance for ADLs. NOT MET CONTINUE   3. Patient will be independent with UE finalized HEP for ease with eventual return to mowing his lawn. NOT MET CONTINUE     Patient Education:   []  HEP/Education Completed: Plan of Care, Goals, adaptive equipment options available for ADLS, IADLs with catalog issued   [employer] Access Code for HEP:   []   new Education completed with pink sponge issued for  and lateral pinch strength  [x]  Reviewed Prior HEP      [x]  Patient verbalized and/or demonstrated understanding of education provided. []  Patient unable to verbalize and/or demonstrate understanding of education provided. Will continue education.   [x]  Barriers to learning: none    PLAN:  Treatment Recommendations: Strengthening, Endurance Training and Home Exercise Program    []  Plan of care initiated. Plan to see patient 2 times per week for 4 more weeks to address the treatment planned outlined above.   [x]  Continue with current plan of care  []  Modify plan of care as follows:    []  Hold pending physician visit  []  Discharge    Time In 0816   Time Out 0846   Timed Code Minutes: 30min   Total Treatment Time: 30 min     Cecilio Neville OTR/L #[id number]

## 2021-05-19 NOTE — PROGRESS NOTES
[address] [facility]  PHYSICAL THERAPY  [] EVALUATION  [x] DAILY NOTE (LAND) [] DAILY NOTE (AQUATIC ) [] PROGRESS NOTE [] DISCHARGE NOTE    [] [facility]   [] Brenda [address]    [] [address] [employer]   [x] Angela Marino    Date: 2021  Patient Name:  [place] Hyman  : 1945  MRN: [id number]  CSN: [id number]    Referring Practitioner DEB Hill -*   Diagnosis Gouty Arthropathy    Treatment Diagnosis Difficulty walking; balance dysfunction    Date of Evaluation 21    Additional Pertinent History DM; SOB; HTN      Functional Outcome Measure Used Tinetti   Functional Outcome Score  (21)    (21)      Insurance: Primary: Payor: Mike Cano /  /  / ,   Secondary:    Authorization Information: Pre-certification not needed    Visit # 11, 1/10 for progress note   Visits Allowed: Based on medical necessity    Recertification Date: May 27, 21   Physician Follow-Up:    Physician Orders:    History of Present Illness: Jose Manuel Del Toro was recently discharged from [facility] rehab following hospitalization for cellulitis in left LE. SUBJECTIVE: Pt continues to walk with SC. Pt having more pain in LE's today.      TREATMENT   Precautions:    Pain:  3/10 L ankle, 2/10 R knee    X in shaded column indicates activity completed today   Modalities Parameters/  Location  Notes                     Manual Therapy Time/Technique  Notes   Stretch right hip flexor off edge of mat--long leg traction to stretch  - 5 min  Also quad sets to straigthen knee      STM throughout left foot/ankle; calcaneal distraction with passive stretch into DF; posterior glide of talus  15 min x Pitting edema noted         Exercise/Intervention   Notes   Discussed HEP provided by rehab staff at [facility]; provided blue band              Nustep  Arms 10 , legs 11  Level 5 6 min x Blue band around thighs to keep from abducting          Dynamic gait independent HEP. ONGOING  3. Doug's Tinetti score will improve to >23/28 indicating no fall risk. NOT MET  4. David Trevino will be able to walk 200 ft at a time without needing a rest break so he is able to walk into the restaurant from his car without fatigue. ONGOING  5. NEW GOAL: David Trevino will be able to transition from concrete to grassy surfaces with greater confidence and without loss of balance so he may walk throughout his property without LOB. Patient Education:   [x]  HEP/Education Completed: ankle pumps with elevated leg, compressions stockings to decrease edema   [employer] Access Code:  []  No new Education completed  []  Reviewed Prior HEP-provided by rehab      [x]  Patient verbalized and/or demonstrated understanding of education provided. []  Patient unable to verbalize and/or demonstrate understanding of education provided. Will continue education. [x]  Barriers to learning: n/a     PLAN:  Treatment Recommendations: Strengthening, Range of Motion, Balance Training, Functional Mobility Training, Endurance Training, Gait Training, Stair Training, Neuromuscular Re-education, Home Exercise Program, Patient Education, Safety Education and Training and Aquatics    []  Plan of care initiated. Plan to see patient 2 times per week for 6 weeks to address the treatment planned outlined above.   [x]  Continue with current plan of care  []  Modify plan of care as follows:    []  Hold pending physician visit  []  Discharge    Time In 0845   Time Out 0925   Timed Code Minutes: 40 min   Total Treatment Time: 40 min       Electronically Signed by: Jae Colindres PTA

## 2021-05-21 ENCOUNTER — HOSPITAL ENCOUNTER (OUTPATIENT)
Dept: PHYSICAL THERAPY | Age: 76
Setting detail: THERAPIES SERIES
Discharge: HOME | End: 2021-05-21
Payer: MEDICARE

## 2021-05-21 ENCOUNTER — HOSPITAL ENCOUNTER (OUTPATIENT)
Dept: OCCUPATIONAL THERAPY | Age: 76
Setting detail: THERAPIES SERIES
Discharge: HOME | End: 2021-05-21
Payer: MEDICARE

## 2021-05-21 PROCEDURE — 97530 THERAPEUTIC ACTIVITIES: CPT

## 2021-05-21 PROCEDURE — 97140 MANUAL THERAPY 1/> REGIONS: CPT

## 2021-05-21 PROCEDURE — 97110 THERAPEUTIC EXERCISES: CPT

## 2021-05-21 NOTE — PROGRESS NOTES
[address]  [] EVALUATION  [x] DAILY NOTE (LAND) [] DAILY NOTE (AQUATIC )   [] PROGRESS NOTE [] DISCHARGE NOTE    [] [facility]   [] [address]    [] [address] [employer]   [x] Efren Tabro    Date: 2021  Patient Name:  Keyana Saab  : 1945  MRN: [id number]  CSN: [id number]    Referring Practitioner Joselo BOYD   Diagnosis GOUT    Treatment Diagnosis Impaired UE strength, impaired endurance, decreased ADLs   Date of Evaluation 21      Functional Outcome Measure Used Functional Impact Questionaire   Functional Outcome Score 68 (21)  38  21       Insurance: Primary: Payor: Radha Amaya /  /  / ,   Secondary: [facility]   Authorization Information: Unlimited visits based on medical necessity   Visit # 12, 2/10 for progress note   Visits Allowed: Unlimited based on medical necessity   Recertification Date:    Physician Follow-Up: Not stated   Physician Orders: Script dated 3/25/2021 for OT eval and treat   Pertinent History Patient was admitted to [facility] on 3/12/21 with lower extremity swelling, deconditioned state. Negative for DVT. Patient had inpatient rehab due to deconditioning. PMH includes per medical notes from [facility] includes: Neuropathy, hypertension, hypercholesterolemia, hyperlipidemia, DVT, Lupus, polyarthritis, anticoagulation, asthma, pneumonia, pulmonary embolism, diabetes, hepatitis B, BPH, arthritis, basal ricky carcinoma, anemia, left total hip replacement, spinal fusion,      SUBJECTIVE: Patient reports he thinks his arms and hands are getting stronger. States \"I don't know how much we can do with all of the arthritis. \"     OBJECTIVE:      TREATMENT   Precautions: fall risk    Pain: No complaints     X in shaded column indicates Activity Completed Today   Modalities Parameters/  Location  Notes/Comments                     Manual Therapy Time/  Technique  Notes/Comments   STM manually to right and left wrists and for pain control and tight musculature   Some crepitus noted at right ulnar wrist.                Exercises   Sets/  Sec Reps  Notes/Comments   Scapular retraction 1 15     AROM right and left digits flexion/extension and abduction/adduction 1 10 each     [place] theraband in sitting for B lat pull down, rows, bicep curls 1 10 ea X Min fatigue noted   Red flex bar pronation, supination, twists   1     10 each      X        Yellow spring loaded gripper (10#) resistance   20 each  Left hand and right hand   PROM to right and left wrist flexion/extension and right and left digits individual and composite flexion/extension. X          [place] theraband in standing for B rows, shoulder flexion and shoulder extension  15 each  One sitting rest breaks. Patient reports back pain makes him need to sit for a rest break. Retrieving 10 pennies from orange putty with bilateral hands   X    Orange putty taffy pulls   X    Orange putty squeezing/manipulating   X    Biodex at 100 RPM, 2 minutes forward and 3 minute backwards   X    Activities Time    Notes/Comments   Discussed adaptive equipment options available for in the bathroom, dressing, writing, eating. Adaptive equipment catalog issued   Discussed compression glove for warmth, comfort and compression for arthritis. Information given for ordering with recommended size   Patient ambulated in clinic with cane 30 feet then 20 feet        Specific Interventions Next Treatment: UE strengthening, endurance, HEP, ADLs, adaptive equipment and technique education    Activity/Treatment Tolerance:  [x]  Patient tolerated treatment well  []  Patient limited by fatigue  []  Patient limited by pain   []  Patient limited by other medical complications  []  Other:     Assessment: Patient is progressing towards goals. Continues to report decreased strength and endurance overall.    Areas for Improvement: edema, Will continue education. [x]  Barriers to learning: none    PLAN:  Treatment Recommendations: Strengthening, Endurance Training and Home Exercise Program    []  Plan of care initiated. Plan to see patient 2 times per week for 4 more weeks to address the treatment planned outlined above.   [x]  Continue with current plan of care  []  Modify plan of care as follows:    []  Hold pending physician visit  []  Discharge    Time In 0830   Time Out 0911   Timed Code Minutes: 41 min   Total Treatment Time: 39 min     DAVID ALFRED/SWATI #[id number]

## 2021-05-21 NOTE — PROGRESS NOTES
[facility]YS[facility]  [] EVALUATION  [x] DAILY NOTE (LAND) [] DAILY NOTE (AQUATIC ) [] PROGRESS NOTE [] DISCHARGE NOTE    [] [facility]   [] Brenda Lee    [] [address] [employer]   [x] Jan Saab    Date: 2021  Patient Name:  Scottie Calvert  : 1945  MRN: [id number]  CSN: [id number]    Referring Practitioner DEB Villar -*   Diagnosis Gouty Arthropathy    Treatment Diagnosis Difficulty walking; balance dysfunction    Date of Evaluation 21    Additional Pertinent History DM; SOB; HTN      Functional Outcome Measure Used Tinetti   Functional Outcome Score  (21)    (21)      Insurance: Primary: Payor: Joe Dial /  /  / ,   Secondary:    Authorization Information: Pre-certification not needed    Visit # 12, 2/10 for progress note   Visits Allowed: Based on medical necessity    Recertification Date: May 27, 21   Physician Follow-Up:    Physician Orders:    History of Present Illness: Williams Martinez was recently discharged from [facility] rehab following hospitalization for cellulitis in left LE.      SUBJECTIVE: Spent most of yesterday on his riding mower    TREATMENT   Precautions:    Pain:  3/10 L ankle, 2/10 R knee    X in shaded column indicates activity completed today   Modalities Parameters/  Location  Notes                     Manual Therapy Time/Technique  Notes   Stretch right hip flexor off edge of mat--long leg traction to stretch  - 5 min  Also quad sets to straigthen knee      STM throughout left foot/ankle; calcaneal distraction with passive stretch into DF; posterior glide of talus  15 min x Pitting edema noted         Exercise/Intervention   Notes   Discussed HEP provided by rehab staff at [facility]; provided blue band              Nustep  Arms 10 , legs 11  Level 5 6 min x Blue band around thighs to keep from abducting          Dynamic gait in // bars: forward, side stepping, retro; tandem 2 laps  x    Stepping over blue foam 2x   x3 on R, difficult, to encourage bigger steps    Airex: Heel toe raises, marching, mini squats 10x   x  Restricted ROM with ankles   Hurdles (forward and lateral) 6x  x    Sitting: LAQ, marching, Hip abduction with green band , adductor squeeze 15 x      Green t band sitting   PF  10 x       Sit to stand 5x  x  no UE support   Long sitting ankle pumps 20x             Review of goals; Tinetti         Specific Interventions Next Treatment: NuStep; standing balance activities; dynamic gait activities; endurance     Activity/Treatment Tolerance:  [x]  Patient tolerated treatment well  [x]  Patient limited by fatigue  []  Patient limited by pain   []  Patient limited by medical complications  []  Other:     Assessment: Added hurdles today and he did a great job clearing the obstacles; as he fatigued he started to circumduct at the hip on his weaker left side. Body Structures/Functions/Activity Limitations: impaired activity tolerance, impaired balance, impaired coordination, impaired endurance, impaired ROM, impaired sensation, impaired strength and abnormal gait  Prognosis: fair    GOALS:  Patient Goal: Improve balance and stamina    Short Term Goals:  Time Frame: 3 weeks  1. Adeel Cobos will demonstrate a good ankle strategy when maintaining balance with perturbations to his trunk. GOAL MET   2. Adeel Cobos will demonstrate \"nose over toes\" positioning with sit-stand transfers without needing verbal cuing. GOAL MET   3. Doug's Tinetti score will improve to 19/28 indicating minimal fall risk. NOT MET: improved to 17/28      Long Term Goals:  Time Frame: 6 weeks   1. Adeel Cobos will transition from walking with rollator to [facility] for greater ease with community ambulation and to return to [facility]. GOAL MET   2. Adeel Cobos will be discharged from PT with independent HEP. ONGOING  3. Doug's Tinetti score will improve to >23/28 indicating no fall risk.  NOT MET  4. Adeel Cobos will be able to walk 200 ft at a time without needing a rest break so he is able to walk into the restaurant from his car without fatigue. ONGOING  5. NEW GOAL: Johnathan Bustos will be able to transition from concrete to grassy surfaces with greater confidence and without loss of balance so he may walk throughout his property without LOB. Patient Education:   [x]  HEP/Education Completed: ankle pumps with elevated leg, compressions stockings to decrease edema   Medbridge Access Code:  []  No new Education completed  []  Reviewed Prior HEP-provided by rehab      [x]  Patient verbalized and/or demonstrated understanding of education provided. []  Patient unable to verbalize and/or demonstrate understanding of education provided. Will continue education. [x]  Barriers to learning: n/a     PLAN:  Treatment Recommendations: Strengthening, Range of Motion, Balance Training, Functional Mobility Training, Endurance Training, Gait Training, Stair Training, Neuromuscular Re-education, Home Exercise Program, Patient Education, Safety Education and Training and Aquatics    []  Plan of care initiated. Plan to see patient 2 times per week for 6 weeks to address the treatment planned outlined above.   [x]  Continue with current plan of care  []  Modify plan of care as follows:    []  Hold pending physician visit  []  Discharge    Time In 0929   Time Out 1000   Timed Code Minutes: 31 min   Total Treatment Time: 31 min       Electronically Signed by: HERO Morales [id number]" Ashley Zepeda, DPT  [id number]

## 2021-05-26 ENCOUNTER — HOSPITAL ENCOUNTER (OUTPATIENT)
Dept: PHYSICAL THERAPY | Age: 76
Setting detail: THERAPIES SERIES
Discharge: HOME | End: 2021-05-26
Payer: MEDICARE

## 2021-05-26 ENCOUNTER — HOSPITAL ENCOUNTER (OUTPATIENT)
Dept: OCCUPATIONAL THERAPY | Age: 76
Setting detail: THERAPIES SERIES
Discharge: HOME | End: 2021-05-26
Payer: MEDICARE

## 2021-05-26 PROCEDURE — 97110 THERAPEUTIC EXERCISES: CPT

## 2021-05-26 PROCEDURE — 97140 MANUAL THERAPY 1/> REGIONS: CPT

## 2021-05-26 NOTE — PROGRESS NOTES
Nustep  Arms 10 , legs 11  Level 5 6 min x Blue band around thighs to keep from abducting          Dynamic gait in // bars: forward, side stepping, retro; tandem 2 laps  x    Stepping over blue foam 2x   x3 on R, difficult, to encourage bigger steps    Airex: Heel toe raises, marching, mini squats 10x   x  Restricted ROM with ankles   Standing gastroc stretch in // bars 15 sec 3x x    Hurdles  1lap  x R lead, L lead   Sitting: LAQ, marching, Hip abduction with green band , adductor squeeze 15 x      Green t band sitting   PF  10 x       Sit to stand 5x  x  no UE support   Long sitting ankle pumps 20x             Review of goals; Tinetti         Specific Interventions Next Treatment: NuStep; standing balance activities; dynamic gait activities; endurance     Activity/Treatment Tolerance:  [x]  Patient tolerated treatment well  [x]  Patient limited by fatigue  []  Patient limited by pain   []  Patient limited by medical complications  []  Other:     Assessment: Cues for neutral foot placement during dynamic gait activities due to walking on lateral foot. Pt continues to have pitting edema in B ankles. Pt able to perform standing exercises with improved endurance. Body Structures/Functions/Activity Limitations: impaired activity tolerance, impaired balance, impaired coordination, impaired endurance, impaired ROM, impaired sensation, impaired strength and abnormal gait  Prognosis: fair    GOALS:  Patient Goal: Improve balance and stamina    Short Term Goals:  Time Frame: 3 weeks  1. Jose Manuel Del Toro will demonstrate a good ankle strategy when maintaining balance with perturbations to his trunk. GOAL MET   2. Jose Manuel Del Toro will demonstrate \"nose over toes\" positioning with sit-stand transfers without needing verbal cuing. GOAL MET   3. Doug's Tinetti score will improve to 19/28 indicating minimal fall risk. NOT MET: improved to 17/28      Long Term Goals:  Time Frame: 6 weeks   1.  Jose Manuel Del Toro will transition from walking with rollator to [facility] for greater ease with community ambulation and to return to PLOF. GOAL MET   2. Melba Marshall will be discharged from PT with independent HEP. ONGOING  3. Doug's Tinetti score will improve to >23/28 indicating no fall risk. NOT MET  4. Melba Marshall will be able to walk 200 ft at a time without needing a rest break so he is able to walk into the restaurant from his car without fatigue. ONGOING  5. NEW GOAL: Melba Marshall will be able to transition from concrete to grassy surfaces with greater confidence and without loss of balance so he may walk throughout his property without LOB. Patient Education:   [x]  HEP/Education Completed: ankle pumps with elevated leg, compressions stockings to decrease edema   Medbridge Access Code:  []  No new Education completed  [x]  Reviewed Prior HEP-provided by rehab      [x]  Patient verbalized and/or demonstrated understanding of education provided. []  Patient unable to verbalize and/or demonstrate understanding of education provided. Will continue education. [x]  Barriers to learning: n/a     PLAN:  Treatment Recommendations: Strengthening, Range of Motion, Balance Training, Functional Mobility Training, Endurance Training, Gait Training, Stair Training, Neuromuscular Re-education, Home Exercise Program, Patient Education, Safety Education and Training and Aquatics    []  Plan of care initiated. Plan to see patient 2 times per week for 6 weeks to address the treatment planned outlined above.   [x]  Continue with current plan of care  []  Modify plan of care as follows:    []  Hold pending physician visit  []  Discharge    Time In 1500   Time Out 1540   Timed Code Minutes: 40 min   Total Treatment Time: 40 min       Electronically Signed by: Selam Salgado PTA

## 2021-05-26 NOTE — PROGRESS NOTES
[address]  [] EVALUATION  [x] DAILY NOTE (LAND) [] DAILY NOTE (AQUATIC )   [] PROGRESS NOTE [] DISCHARGE NOTE    [] [facility]   [] [address]    [] [address] AKI   [x] Delicia Wongamento    Date: 2021  Patient Name:  Hay Ng  : 1945  MRN: [id number]  CSN: [id number]    Referring Practitioner Love BOYD   Diagnosis GOUT    Treatment Diagnosis Impaired UE strength, impaired endurance, decreased ADLs   Date of Evaluation 21      Functional Outcome Measure Used Functional Impact Questionaire   Functional Outcome Score 68 (21)  38  21       Insurance: Primary: Payor: Dash Ontiveros /  /  / ,   Secondary: [facility]   Authorization Information: Unlimited visits based on medical necessity   Visit # 13, 3/10 for progress note   Visits Allowed: Unlimited based on medical necessity   Recertification Date:    Physician Follow-Up: Not stated   Physician Orders: Script dated 3/25/2021 for OT eval and treat   Pertinent History Patient was admitted to [facility] on 3/12/21 with lower extremity swelling, deconditioned state. Negative for DVT. Patient had inpatient rehab due to deconditioning. PMH includes per medical notes from [facility] includes: Neuropathy, hypertension, hypercholesterolemia, hyperlipidemia, DVT, Lupus, polyarthritis, anticoagulation, asthma, pneumonia, pulmonary embolism, diabetes, hepatitis B, BPH, arthritis, basal ricky carcinoma, anemia, left total hip replacement, spinal fusion,      SUBJECTIVE: Patient reports he has been using modifications for household tasks. Pt. Reports he uses dycem to open tight/new jars or twist cap bottles. Pt. Reports slight discomfort throughout B hands.      OBJECTIVE:      TREATMENT   Precautions: fall risk    Pain: No complaints     X in shaded column indicates Activity Completed Today   Modalities Parameters/  Location  Notes/Comments                     Manual Therapy Time/  Technique  Notes/Comments   STM manually to right and left wrists and for pain control and tight musculature   Some crepitus noted at right ulnar wrist.                Exercises   Sets/  Sec Reps  Notes/Comments   Scapular retraction 1 15     AROM right and left digits flexion/extension and abduction/adduction 1 10 each     [place] theraband in sitting for B lat pull down, rows, bicep curls 1 10 ea X Min fatigue noted. RB required throughout each exercise before next set. Red flex bar pronation, supination, twists   1     10 each      X        Green spring loaded gripper 20#) resistance   20 each X Left hand and right hand  Increased resistance from yellow to green to provide just right challenge. Fatigue and fair strength shown throughout reps. PROM to right and left wrist flexion/extension and right and left digits individual and composite flexion/extension. X          [place] theraband in standing for B rows, shoulder flexion and shoulder extension  15 each  One sitting rest breaks. Patient reports back pain makes him need to sit for a rest break. Retrieving 10 pennies from orange putty with bilateral hands       Orange putty taffy pulls       Orange putty squeezing/manipulating   X    Clothespin Standing Place up with R hand, Bring down with L hand   Red and yellow  X Initiated on this date to target overall endurance, along with [facility] and pinch strength, to target self care tasks such as zippers and buttons. Mild difficulty shown throughout cross body reaching. Biodex at 100 RPM, 2 minutes forward and 3 minute backwards   X    Activities Time    Notes/Comments   Discussed adaptive equipment options available for in the bathroom, dressing, writing, eating. Adaptive equipment catalog issued   Discussed compression glove for warmth, comfort and compression for arthritis.    Information given for ordering with recommended size Patient ambulated in clinic with cane 30 feet then 20 feet        Specific Interventions Next Treatment: UE strengthening, endurance, HEP, ADLs, adaptive equipment and technique education    Activity/Treatment Tolerance:  [x]  Patient tolerated treatment well  []  Patient limited by fatigue  []  Patient limited by pain   []  Patient limited by other medical complications  []  Other:     Assessment: Patient is progressing towards goals. Fair to poor strength was noted throughout UE strengthening exercises, with overall fatigue noted. Pt. Reports continued balance issue with self care tasks at home secondary to endurance. Areas for Improvement: edema, impaired coordination, impaired endurance and impaired strength  Prognosis: fair/good    GOALS:  Patient Goal: Return to driving a car, walk better, and increase strength and endurance, mow the lawn. Short Term Goals:  Time Frame: 5 weeks  1. Patient will be independent with UE HEP for increased ease with eventual driving and car transfers. GOAL MET Revised goal Patient will be independent with upgraded HEP for increase ease with dressing . 2.  Patient will increase stand endurance x 6 minutes during UE functional activity for increased endurance for ambulation to and from car to appointments NOT MET 4/10. CONTINUE GOAL     3. Patient will increase B  strength to 35# for increased ease with opening jars. NOT MET Right 28 pounds, ,left 27 pounds CONTINUE GOAL   4. Patient will explore adaptive equipment and techniques for increased ease with self care and dressing. Using  it material to open water bottles. Patient was issued cylinder foam adapted standard pen. Adaptive anti vibratory gloves are used and helping with advanced ADL's . PART MET CONTINUE    NEW GOAL # 5 Increase right lateral pinch to 14 # and left to 19 pounds for increase ease opening packages and picking up pills . Long Term Goals:  Time Frame: 8 weeks  1.   Patient will improve UEFS to at least 66. NOT MET 38  2. Patient will increase stand endurance to 8 minutes for increased endurance for ADLs. NOT MET CONTINUE   3. Patient will be independent with UE finalized HEP for ease with eventual return to mowing his lawn. NOT MET CONTINUE     Patient Education:   []  HEP/Education Completed: Plan of Care, Goals, adaptive equipment options available for ADLS, IADLs with catalog issued   [address] Access Code for HEP:   [x]  No New Education   []  Reviewed Prior HEP      [x]  Patient verbalized and/or demonstrated understanding of education provided. []  Patient unable to verbalize and/or demonstrate understanding of education provided. Will continue education. [x]  Barriers to learning: none    PLAN:  Treatment Recommendations: Strengthening, Endurance Training and Home Exercise Program    []  Plan of care initiated. Plan to see patient 2 times per week for 4 more weeks to address the treatment planned outlined above.   [x]  Continue with current plan of care  []  Modify plan of care as follows:    []  Hold pending physician visit  []  Discharge    Time In 1415   Time Out 1459   Timed Code Minutes: 44 min   Total Treatment Time: 44 min     Mireya LOYOLA #[id number]

## 2021-05-28 ENCOUNTER — HOSPITAL ENCOUNTER (OUTPATIENT)
Dept: PHYSICAL THERAPY | Age: 76
Setting detail: THERAPIES SERIES
Discharge: HOME | End: 2021-05-28
Payer: MEDICARE

## 2021-05-28 ENCOUNTER — HOSPITAL ENCOUNTER (OUTPATIENT)
Dept: OCCUPATIONAL THERAPY | Age: 76
Setting detail: THERAPIES SERIES
Discharge: HOME | End: 2021-05-28
Payer: MEDICARE

## 2021-05-28 PROCEDURE — 97140 MANUAL THERAPY 1/> REGIONS: CPT

## 2021-05-28 PROCEDURE — 97530 THERAPEUTIC ACTIVITIES: CPT

## 2021-05-28 PROCEDURE — 97110 THERAPEUTIC EXERCISES: CPT

## 2021-05-28 NOTE — PROGRESS NOTES
[address]  [] EVALUATION  [x] DAILY NOTE (LAND) [] DAILY NOTE (AQUATIC )   [] PROGRESS NOTE [] DISCHARGE NOTE    [] [facility]   [] [address]    [] [address] [facility]   [x] Francois Leal    Date: 2021  Patient Name:  Juliano Heart  : 1945  MRN: [id number]  CSN: [id number]    Referring Practitioner Leandra BOYD   Diagnosis GOUT    Treatment Diagnosis Impaired UE strength, impaired endurance, decreased ADLs   Date of Evaluation 21      Functional Outcome Measure Used Functional Impact Questionaire   Functional Outcome Score 68 (21)  38  21       Insurance: Primary: Payor: Linda Rangel /  /  / ,   Secondary: [employer] [place]   Authorization Information: Unlimited visits based on medical necessity   Visit # 14, 4/10 for progress note   Visits Allowed: Unlimited based on medical necessity   Recertification Date: 737   Physician Follow-Up: Not stated   Physician Orders: Script dated 3/25/2021 for OT eval and treat   Pertinent History Patient was admitted to [facility] on 3/12/21 with lower extremity swelling, deconditioned state. Negative for DVT. Patient had inpatient rehab due to deconditioning. PMH includes per medical notes from [facility] includes: Neuropathy, hypertension, hypercholesterolemia, hyperlipidemia, DVT, Lupus, polyarthritis, anticoagulation, asthma, pneumonia, pulmonary embolism, diabetes, hepatitis B, BPH, arthritis, basal ricky carcinoma, anemia, left total hip replacement, spinal fusion,      SUBJECTIVE: Patient reports his energy is better at home. Is consistently having less pain in his hands.     OBJECTIVE:      TREATMENT   Precautions: fall risk    Pain: None at rest.     X in shaded column indicates Activity Completed Today   Modalities Parameters/  Location  Notes/Comments                     Manual Therapy Time/  Technique  Notes/Comments   [facility] and left [facility] and for pain control and tight musculature   Some crepitus noted at right ulnar wrist.                Exercises   Sets/  Sec Reps  Notes/Comments   Scapular retraction 1 15     AROM right and left digits flexion/extension and abduction/adduction 1 10 each     [place] theraband in sitting for B lat pull down, rows, bicep curls 1 10 ea  Min fatigue noted. RB required throughout each exercise before next set. Red flex bar pronation, supination, twists   1     10 each      X        Green spring loaded gripper 20#) resistance   20 each  Left hand and right hand  Increased resistance from yellow to green to provide just right challenge. Fatigue and fair strength shown throughout reps. PROM to right and left wrist flexion/extension and right and left digits individual and composite flexion/extension. X          Prayer stretch 10 sec 5 X One sitting rest breaks. Patient reports back pain makes him need to sit for a rest break. AROM bilateral wrists over blue flex bar all motions  1 10 ea X    Orange putty taffy pulls       Orange putty squeezing/manipulating       Clothespin Standing Place up with R hand, Bring down with L hand   Red and yellow   Initiated on this date to target overall endurance, along with [address] and pinch strength, to target self care tasks such as zippers and buttons. Mild difficulty shown throughout cross body reaching. Biodex at 100 RPM, 2 minutes forward and 3 minute backwards   X    Activities Time    Notes/Comments   Explored taping and strap options to improve thumb opposition in R hand, no success to bring thumb into opposition enough for a functional . Patient would benefit from thumb opposition splint in the future. X Adaptive equipment catalog issued   Discussed compression glove for warmth, comfort and compression for arthritis.    Information given for ordering with recommended size   Patient ambulated in clinic with cane 30 feet then 20 feet Specific Interventions Next Treatment: UE strengthening, endurance, HEP, ADLs, adaptive equipment and technique education    Activity/Treatment Tolerance:  [x]  Patient tolerated treatment well  []  Patient limited by fatigue  []  Patient limited by pain   []  Patient limited by other medical complications  []  Other:     Assessment: Patient is progressing towards goals. Plan to address a more functional /pinch in R hand. Areas for Improvement: edema, impaired coordination, impaired endurance and impaired strength  Prognosis: fair/good    GOALS:  Patient Goal: Return to driving a car, walk better, and increase strength and endurance, mow the lawn. Short Term Goals:  Time Frame: 5 weeks  1. Patient will be independent with UE HEP for increased ease with eventual driving and car transfers. GOAL MET Revised goal Patient will be independent with upgraded HEP for increase ease with dressing . 2.  Patient will increase stand endurance x 6 minutes during UE functional activity for increased endurance for ambulation to and from car to appointments NOT MET 4/10. CONTINUE GOAL     3. Patient will increase B  strength to 35# for increased ease with opening jars. NOT MET Right 28 pounds, ,left 27 pounds CONTINUE GOAL   4. Patient will explore adaptive equipment and techniques for increased ease with self care and dressing. Using  it material to open water bottles. Patient was issued cylinder foam adapted standard pen. Adaptive anti vibratory gloves are used and helping with advanced ADL's . PART MET CONTINUE    NEW GOAL # 5 Increase right lateral pinch to 14 # and left to 19 pounds for increase ease opening packages and picking up pills . Long Term Goals:  Time Frame: 8 weeks  1. Patient will improve UEFS to at least 78. NOT MET 38  2. Patient will increase stand endurance to 8 minutes for increased endurance for ADLs. NOT MET CONTINUE   3.   Patient will be independent with UE finalized HEP for

## 2021-06-01 ENCOUNTER — HOSPITAL ENCOUNTER (OUTPATIENT)
Dept: OCCUPATIONAL THERAPY | Age: 76
Setting detail: THERAPIES SERIES
Discharge: HOME | End: 2021-06-01
Payer: MEDICARE

## 2021-06-01 ENCOUNTER — HOSPITAL ENCOUNTER (OUTPATIENT)
Dept: PHYSICAL THERAPY | Age: 76
Setting detail: THERAPIES SERIES
Discharge: HOME | End: 2021-06-01
Payer: MEDICARE

## 2021-06-01 PROCEDURE — 97110 THERAPEUTIC EXERCISES: CPT

## 2021-06-01 PROCEDURE — 97140 MANUAL THERAPY 1/> REGIONS: CPT

## 2021-06-01 NOTE — PROGRESS NOTES
right and left wrists and forearms for pain control and tight musculature  X                 Exercises   Sets/  Sec Reps  Notes/Comments   Scapular retraction 1 15 X    AROM right and left digits flexion/extension and abduction/adduction 1 10 each X    Orange theraband for bicep curls  20 X    [place] theraband in sitting for B lat pull down and rows 1 10 each X    Red flex bar bends in pronation, supination, twists   1     15 each      X        Green spring loaded gripper (20# resistance)  20 each X    [place] theraband in standing for B shoulder flexion, extension and ER 1 10 X Stood x 3 minutes then needed rest due to increase in back discomfort. PROM to right and left wrist flexion/extension and right and left digits individual and composite flexion/extension. PROM to right thumb palmar abduction   X          Prayer stretch 10 sec 5 X    AROM bilateral wrists over blue flex bar all motions  1 10 ea     Orange putty taffy pulls       Orange putty squeezing/manipulating       Clothespin Standing Place up with R hand, Bring down with L hand   Red and yellow   Initiated on this date to target overall endurance, along with [address] [place] and pinch strength, to target self care tasks such as zippers and buttons. Mild difficulty shown throughout cross body reaching. Biodex at 100 RPM, 2 minutes forward and 3 minute backwards   X    Activities Time    Notes/Comments   Explored taping and strap options to improve thumb opposition in R hand, no success to bring thumb into opposition enough for a functional . Patient would benefit from thumb opposition splint in the future. Adaptive equipment catalog issued   Discussed compression glove for warmth, comfort and compression for arthritis.    Information given for ordering with recommended size   Patient ambulated in clinic with cane 30 feet then 20 feet        Specific Interventions Next Treatment: UE strengthening, endurance, HEP, ADLs, adaptive equipment and technique education    Activity/Treatment Tolerance:  [x]  Patient tolerated treatment well  []  Patient limited by fatigue  []  Patient limited by pain   []  Patient limited by other medical complications  []  Other:     Assessment: Patient is progressing towards goals. Areas for Improvement: edema, impaired coordination, impaired endurance and impaired strength  Prognosis: fair/good    GOALS:  Patient Goal: Return to driving a car, walk better, and increase strength and endurance, mow the lawn. Short Term Goals:  Time Frame: 5 weeks  1. Patient will be independent with UE HEP for increased ease with eventual driving and car transfers. GOAL MET Revised goal Patient will be independent with upgraded HEP for increase ease with dressing . 2.  Patient will increase stand endurance x 6 minutes during UE functional activity for increased endurance for ambulation to and from car to appointments NOT MET 4/10. CONTINUE GOAL     3. Patient will increase B  strength to 35# for increased ease with opening jars. NOT MET Right 28 pounds, ,left 27 pounds CONTINUE GOAL   4. Patient will explore adaptive equipment and techniques for increased ease with self care and dressing. Using  it material to open water bottles. Patient was issued cylinder foam adapted standard pen. Adaptive anti vibratory gloves are used and helping with advanced ADL's . PART MET CONTINUE    NEW GOAL # 5 Increase right lateral pinch to 14 # and left to 19 pounds for increase ease opening packages and picking up pills . Long Term Goals:  Time Frame: 8 weeks  1. Patient will improve UEFS to at least 78. NOT MET 38  2. Patient will increase stand endurance to 8 minutes for increased endurance for ADLs. NOT MET CONTINUE   3. Patient will be independent with UE finalized HEP for ease with eventual return to mowing his lawn.  NOT MET CONTINUE     Patient Education:   []  HEP/Education Completed: Explored options to improve functional /pinch in R hand.    [address] Access Code for HEP:   []  No New Education   [x]  Reviewed Prior HEP      [x]  Patient verbalized and/or demonstrated understanding of education provided. []  Patient unable to verbalize and/or demonstrate understanding of education provided. Will continue education. [x]  Barriers to learning: none    PLAN:  Treatment Recommendations: Strengthening, Endurance Training and Home Exercise Program    []  Plan of care initiated. Plan to see patient 2 times per week for 4 more weeks to address the treatment planned outlined above.   [x]  Continue with current plan of care  []  Modify plan of care as follows:    []  Hold pending physician visit  []  Discharge    Time In 0828   Time Out 0912   Timed Code Minutes: 44 min   Total Treatment Time: 44 min       Yanet Rm OTR/L #[id number]

## 2021-06-01 NOTE — PROGRESS NOTES
[address] [facility]  PHYSICAL THERAPY  [] EVALUATION  [x] DAILY NOTE (LAND) [] DAILY NOTE (AQUATIC ) [] PROGRESS NOTE [] DISCHARGE NOTE    [] [facility]   [] [address]    [] [address] [facility]   [x] Jeffery Rocha    Date: 2021  Patient Name:  Khadijah Trujillo  : 1945  MRN: [id number]  CSN: [id number]    Referring Practitioner DEB Smith -*   Diagnosis Gouty Arthropathy    Treatment Diagnosis Difficulty walking; balance dysfunction    Date of Evaluation 21    Additional Pertinent History DM; SOB; HTN      Functional Outcome Measure Used Tinetti   Functional Outcome Score  (21)    (21)      Insurance: Primary: Payor: Rhys Jameson /  /  / ,   Secondary:    Authorization Information: Pre-certification not needed    Visit # 15, 1/10 for progress note   Visits Allowed: Based on medical necessity    Recertification Date:    Physician Follow-Up:    Physician Orders:    History of Present Illness: Adri Mayen was recently discharged from [facility] [place], operated by [facility] rehab following hospitalization for cellulitis in left LE. SUBJECTIVE: Had a busy weekend; feeling a bit tired today.  Brought his new compression socks today     TREATMENT   Precautions:    Pain:  3/10 L ankle, 2/10 R knee    X in shaded column indicates activity completed today   Modalities Parameters/  Location  Notes                     Manual Therapy Time/Technique  Notes   Stretch right hip flexor off edge of mat--long leg traction to stretch  - 5 min  Also quad sets to straigthen knee      STM throughout left foot/ankle; calcaneal distraction with passive stretch into DF; posterior glide of talus  25 min x Pitting edema noted; assisted putting on compression stockings          Exercise/Intervention   Notes   Discussed HEP provided by rehab staff at [facility] [place], operated by [facility]; provided blue band              Nustep  Arms 10 , legs 11  Level 5 6.5 min x Blue band around thighs to keep from abducting          Dynamic gait in // bars: forward, side stepping, retro; tandem 2 laps  x    Stepping over blue foam 2x   x3 on R, difficult, to encourage bigger steps    Airex: Heel toe raises, marching, mini squats 10x   x  Restricted ROM with ankles   Standing gastroc stretch in // bars 15 sec 3x     Hurdles  1lap   R lead, L lead   Sitting: LAQ, marching, Hip abduction with green band , adductor squeeze 15 x      Green t band sitting   PF  10 x       Sit to stand 5x  x  no UE support   Long sitting ankle pumps 20x      Seated hip flexion and abd; seated hip flexion, LAQ 10x ea  x    Review of goals; Tinetti         Specific Interventions Next Treatment: NuStep; standing balance activities; dynamic gait activities; endurance     Activity/Treatment Tolerance:  [x]  Patient tolerated treatment well  [x]  Patient limited by fatigue  []  Patient limited by pain   []  Patient limited by medical complications  []  Other:     Assessment:  Narcisa Rodriguez was visibly more fatigued today; greater emphasis on controlling edema and helped don compression stockings. Body Structures/Functions/Activity Limitations: impaired activity tolerance, impaired balance, impaired coordination, impaired endurance, impaired ROM, impaired sensation, impaired strength and abnormal gait  Prognosis: fair    GOALS:  Patient Goal: Improve balance and stamina    Short Term Goals:  Time Frame: 3 weeks  1. Narcisa Rodriguez will demonstrate a good ankle strategy when maintaining balance with perturbations to his trunk. GOAL MET   2. Narcisa Rodriguez will demonstrate \"nose over toes\" positioning with sit-stand transfers without needing verbal cuing. GOAL MET   3. Doug's Tinetti score will improve to 19/28 indicating minimal fall risk. NOT MET: improved to 17/28      Long Term Goals:  Time Frame: 6 weeks   1. Narcisa Rodriguez will transition from walking with rollator to [facility] for greater ease with community ambulation and to return to [facility].  GOAL MET 2. Kayla Lewis will be discharged from PT with independent HEP. ONGOING  3. Doug's Tinetti score will improve to >23/28 indicating no fall risk. NOT MET  4. Kayla Lewis will be able to walk 200 ft at a time without needing a rest break so he is able to walk into the restaurant from his car without fatigue. ONGOING  5. NEW GOAL: Kayla Lewis will be able to transition from concrete to grassy surfaces with greater confidence and without loss of balance so he may walk throughout his property without LOB. Patient Education:   [x]  HEP/Education Completed: ankle pumps with elevated leg, compressions stockings to decrease edema   Med[employer] Access Code:  []  No new Education completed  [x]  Reviewed Prior HEP-provided by rehab      [x]  Patient verbalized and/or demonstrated understanding of education provided. []  Patient unable to verbalize and/or demonstrate understanding of education provided. Will continue education. [x]  Barriers to learning: n/a     PLAN:  Treatment Recommendations: Strengthening, Range of Motion, Balance Training, Functional Mobility Training, Endurance Training, Gait Training, Stair Training, Neuromuscular Re-education, Home Exercise Program, Patient Education, Safety Education and Training and Aquatics    []  Plan of care initiated. Plan to see patient 2 times per week for 6 weeks to address the treatment planned outlined above.   [x]  Continue with current plan of care  []  Modify plan of care as follows:    []  Hold pending physician visit  []  Discharge    Time In 0912   Time Out 1000   Timed Code Minutes: 48 min   Total Treatment Time: 48 min       Electronically Signed by: Sherri Alvarado, PT   Anthony Arreola [id number] SantiagoT  [id number]

## 2021-06-07 ENCOUNTER — HOSPITAL ENCOUNTER (OUTPATIENT)
Dept: OCCUPATIONAL THERAPY | Age: 76
Setting detail: THERAPIES SERIES
Discharge: HOME | End: 2021-06-07
Payer: MEDICARE

## 2021-06-07 ENCOUNTER — HOSPITAL ENCOUNTER (OUTPATIENT)
Dept: PHYSICAL THERAPY | Age: 76
Setting detail: THERAPIES SERIES
Discharge: HOME | End: 2021-06-07
Payer: MEDICARE

## 2021-06-07 PROCEDURE — 97110 THERAPEUTIC EXERCISES: CPT

## 2021-06-07 PROCEDURE — 97140 MANUAL THERAPY 1/> REGIONS: CPT

## 2021-06-07 NOTE — PROGRESS NOTES
[address]  [] EVALUATION  [x] DAILY NOTE (LAND) [] DAILY NOTE (AQUATIC )   [] PROGRESS NOTE [] DISCHARGE NOTE    [] [facility]   [] [address]    [] [address] [facility]   [x] Isaac Shepherd    Date: 2021  Patient Name:  Bita Newton  : 1945  MRN: [id number]  CSN: [id number]    Referring Practitioner Radha BOYD   Diagnosis GOUT    Treatment Diagnosis Impaired UE strength, impaired endurance, decreased ADLs   Date of Evaluation 21      Functional Outcome Measure Used Functional Impact Questionaire   Functional Outcome Score 68 (21)  38  21       Insurance: Primary: Payor: Candy Diaz /  /  / ,   Secondary: [employer] [place]   Authorization Information: Unlimited visits based on medical necessity   Visit # 16, 6/10 for progress note   Visits Allowed: Unlimited based on medical necessity   Recertification Date:    Physician Follow-Up: Not stated   Physician Orders: Script dated 3/25/2021 for OT eval and treat   Pertinent History Patient was admitted to [facility] [place], operated by [facility] on 3/12/21 with lower extremity swelling, deconditioned state. Negative for DVT. Patient had inpatient rehab due to deconditioning. PMH includes per medical notes from [facility] [place], operated by [facility] includes: Neuropathy, hypertension, hypercholesterolemia, hyperlipidemia, DVT, Lupus, polyarthritis, anticoagulation, asthma, pneumonia, pulmonary embolism, diabetes, hepatitis B, BPH, arthritis, basal ricky carcinoma, anemia, left total hip replacement, spinal fusion,      SUBJECTIVE: Patient reports numbness in both hands but it is getting better.       OBJECTIVE:      TREATMENT   Precautions: fall risk    Pain: Denies pain in B hands     X in shaded column indicates Activity Completed Today   Modalities Parameters/  Location  Notes/Comments                     Manual Therapy Time/  Technique  Notes/Comments   STM manually to right and left wrists and forearms for pain control and tight musculature                   Exercises   Sets/  Sec Reps  Notes/Comments   Scapular retraction 1 15 X    AROM right and left digits flexion/extension and abduction/adduction 1 10 each X    Orange theraband for bicep curls  20 X    Orange theraband in sitting for B lat pull down and rows 1 10 each X    Modified prayer stretch 10 seconds 5 X    Red flex bar bends in pronation, supination, twists   1     15 each      X        Green spring loaded gripper (20# resistance)  20 each X    [place] theraband in standing for B shoulder flexion and ER 1 10 X Stood x 3 minutes then needed rest due to increase in back discomfort. Patient tends to lean back against support during shoulder flexion   PROM to right and left wrist flexion/extension and right and left digits individual and composite flexion/extension. PROM to right thumb palmar abduction   X          AROM bilateral shoulder flexion in sitting 1 10 each X    Orange putty taffy pulls       Orange putty squeezing/manipulating       Biodex at 95 RPM, 2 minutes forward and 3 minute backwards   X    Activities Time    Notes/Comments   Explored taping and strap options to improve thumb opposition in R hand, no success to bring thumb into opposition enough for a functional . Patient would benefit from thumb opposition splint in the future. Adaptive equipment catalog issued   Discussed compression glove for warmth, comfort and compression for arthritis.    Information given for ordering with recommended size   Patient ambulated in clinic with cane 30 feet then 20 feet        Specific Interventions Next Treatment: UE strengthening, endurance, HEP, ADLs, adaptive equipment and technique education    Activity/Treatment Tolerance:  [x]  Patient tolerated treatment well  []  Patient limited by fatigue  [x]  Patient limited by pain, back pain with standing   []  Patient limited by other medical complications  []  Other: Assessment: Patient is progressing towards goals. Areas for Improvement: edema, impaired coordination, impaired endurance and impaired strength  Prognosis: fair/good    GOALS:  Patient Goal: Return to driving a car, walk better, and increase strength and endurance, mow the lawn. Short Term Goals:  Time Frame: 5 weeks  1. Patient will be independent with UE HEP for increased ease with eventual driving and car transfers. GOAL MET Revised goal Patient will be independent with upgraded HEP for increase ease with dressing . 2.  Patient will increase stand endurance x 6 minutes during UE functional activity for increased endurance for ambulation to and from car to appointments NOT MET 4/10. CONTINUE GOAL     3. Patient will increase B  strength to 35# for increased ease with opening jars. NOT MET Right 28 pounds, ,left 27 pounds CONTINUE GOAL   4. Patient will explore adaptive equipment and techniques for increased ease with self care and dressing. Using  it material to open water bottles. Patient was issued cylinder foam adapted standard pen. Adaptive anti vibratory gloves are used and helping with advanced ADL's . PART MET CONTINUE    NEW GOAL # 5 Increase right lateral pinch to 14 # and left to 19 pounds for increase ease opening packages and picking up pills . Long Term Goals:  Time Frame: 8 weeks  1. Patient will improve UEFS to at least 78. NOT MET 38  2. Patient will increase stand endurance to 8 minutes for increased endurance for ADLs. NOT MET CONTINUE   3. Patient will be independent with UE finalized HEP for ease with eventual return to mowing his lawn. NOT MET CONTINUE     Patient Education:   []  HEP/Education Completed: Explored options to improve functional /pinch in R hand.  Simple-Fill Access Code for HEP:   []  No New Education   [x]  Reviewed Prior HEP      [x]  Patient verbalized and/or demonstrated understanding of education provided.   []  Patient unable to verbalize and/or demonstrate understanding of education provided. Will continue education. [x]  Barriers to learning: none    PLAN:  Treatment Recommendations: Strengthening, Endurance Training and Home Exercise Program    []  Plan of care initiated. Plan to see patient 2 times per week for 4 more weeks to address the treatment planned outlined above.   [x]  Continue with current plan of care  []  Modify plan of care as follows:    []  Hold pending physician visit  []  Discharge    Time In [phone]   Time Out 0843   Timed Code Minutes: 44 min   Total Treatment Time: 40 min       Manny Cannon OTR/L #[id number]

## 2021-06-07 NOTE — PROGRESS NOTES
[address] [facility]  PHYSICAL THERAPY  [] EVALUATION  [x] DAILY NOTE (LAND) [] DAILY NOTE (AQUATIC ) [] PROGRESS NOTE [] DISCHARGE NOTE    [] [address] [facility]   [] Brenda [address]    [] [address] [facility]   [x] Dave Vidales    Date: 2021  Patient Name:  Taya Morgan  : 1945  MRN: [id number]  CSN: [id number]    Referring Practitioner DEB Navarrete -*   Diagnosis Gouty Arthropathy    Treatment Diagnosis Difficulty walking; balance dysfunction    Date of Evaluation 21    Additional Pertinent History DM; SOB; HTN      Functional Outcome Measure Used Tinetti   Functional Outcome Score  (21)    (21)      Insurance: Primary: Payor: Huang Chen /  /  / ,   Secondary:    Authorization Information: Pre-certification not needed    Visit # 16, 2/10 for progress note   Visits Allowed: Based on medical necessity    Recertification Date:    Physician Follow-Up:    Physician Orders:    History of Present Illness: Adeel Cobos was recently discharged from [facility] rehab following hospitalization for cellulitis in left LE. SUBJECTIVE: Pt stated he mowed yesturday and wore compression socks which helped with edema. Pt stated his feel are doing \"ok\" today.      TREATMENT   Precautions:    Pain:  2/10 ankles, 4-5/10 in low back with movement    X in shaded column indicates activity completed today   Modalities Parameters/  Location  Notes                     Manual Therapy Time/Technique  Notes   Stretch right hip flexor off edge of mat--long leg traction to stretch  - 5 min  Also quad sets to straigthen knee      STM throughout left foot/ankle; calcaneal distraction with passive stretch into DF; posterior glide of talus  10 min x Pitting edema noted; assisted putting on compression stockings          Exercise/Intervention   Notes   Discussed HEP provided by rehab staff at [facility]; provided blue band Nustep  Arms 10 , legs 11  Level 5 6 min x Blue band around thighs to keep from abducting          Dynamic gait in // bars: forward, side stepping, retro; tandem 2 laps  x    Stepping over blue foam 2x   x3 on R, difficult, to encourage bigger steps    Airex:marching, mini squats 10x   x  Restricted ROM with ankles unable to perform heel toe   Standing gastroc stretch in // bars 15 sec 3x x    Hurdles  1lap   R lead, L lead   Sitting: LAQ, marching, Hip abduction with green band , adductor squeeze 15 x      Green t band sitting   PF  10 x       Sit to stand 5x  x  no UE support   sitting ankle pumps 20x      Seated hip flexion and abd; LAQ 10x ea  x    Review of goals; Tinetti         Specific Interventions Next Treatment: NuStep; standing balance activities; dynamic gait activities; endurance     Activity/Treatment Tolerance:  [x]  Patient tolerated treatment well  [x]  Patient limited by fatigue  []  Patient limited by pain   []  Patient limited by medical complications  []  Other:     Assessment:  Shreman Barbour continues to work on safer walking patterns with LE strengthening exercises. Restricted ROM in B ankles. STM to L ankle and foot decreases edema and allows patient to move L ankle batter with less restrictions. Body Structures/Functions/Activity Limitations: impaired activity tolerance, impaired balance, impaired coordination, impaired endurance, impaired ROM, impaired sensation, impaired strength and abnormal gait  Prognosis: fair    GOALS:  Patient Goal: Improve balance and stamina    Short Term Goals:  Time Frame: 3 weeks  1. Sherman Barbour will demonstrate a good ankle strategy when maintaining balance with perturbations to his trunk. GOAL MET   2. Sherman Barbour will demonstrate \"nose over toes\" positioning with sit-stand transfers without needing verbal cuing. GOAL MET   3. Doug's Tinetti score will improve to 19/28 indicating minimal fall risk.  NOT MET: improved to 17/28      Long Term Goals:  Time Frame: 6

## 2021-06-10 ENCOUNTER — HOSPITAL ENCOUNTER (OUTPATIENT)
Dept: OCCUPATIONAL THERAPY | Age: 76
Setting detail: THERAPIES SERIES
Discharge: HOME | End: 2021-06-10
Payer: MEDICARE

## 2021-06-10 ENCOUNTER — HOSPITAL ENCOUNTER (OUTPATIENT)
Dept: PHYSICAL THERAPY | Age: 76
Setting detail: THERAPIES SERIES
Discharge: HOME | End: 2021-06-10
Payer: MEDICARE

## 2021-06-10 PROCEDURE — 97140 MANUAL THERAPY 1/> REGIONS: CPT

## 2021-06-10 PROCEDURE — 97530 THERAPEUTIC ACTIVITIES: CPT

## 2021-06-10 PROCEDURE — 97110 THERAPEUTIC EXERCISES: CPT

## 2021-06-10 PROCEDURE — 97112 NEUROMUSCULAR REEDUCATION: CPT

## 2021-06-10 NOTE — PROGRESS NOTES
[address] [facility]  PHYSICAL THERAPY  [] EVALUATION  [x] DAILY NOTE (LAND) [] DAILY NOTE (AQUATIC ) [] PROGRESS NOTE [] DISCHARGE NOTE    [] [address] [facility]   [] Brenda [address]    [] [address] [facility]   [x] Blake Xiong    Date: 6/10/2021  Patient Name:  Toya Lucia  : 1945  MRN: [id number]  CSN: [id number]    Referring Practitioner DEB Bey -*   Diagnosis Gouty Arthropathy    Treatment Diagnosis Difficulty walking; balance dysfunction    Date of Evaluation 21    Additional Pertinent History DM; SOB; HTN      Functional Outcome Measure Used Tinetti   Functional Outcome Score  (21)    (21)      Insurance: Primary: Payor: Kassie Lara /  /  / ,   Secondary:    Authorization Information: Pre-certification not needed    Visit # 16, 3/10 for progress note   Visits Allowed: Based on medical necessity    Recertification Date:    Physician Follow-Up:    Physician Orders:    History of Present Illness: Renetta Becker was recently discharged from [facility] rehab following hospitalization for cellulitis in left LE.      SUBJECTIVE: Doing well; edema is down in his legs     TREATMENT   Precautions:    Pain:  2/10 ankles, 4-5/10 in low back with movement    X in shaded column indicates activity completed today   Modalities Parameters/  Location  Notes                     Manual Therapy Time/Technique  Notes   Stretch right hip flexor off edge of mat--long leg traction to stretch  - 5 min  Also quad sets to straigthen knee      STM throughout left foot/ankle; calcaneal distraction with passive stretch into DF; posterior glide of talus  10 min x Pitting edema noted; assisted putting on compression stockings          Exercise/Intervention   Notes   Discussed HEP provided by rehab staff at [facility]; provided blue band              Nustep  Arms 10 , legs 12  Level 5 6 min x Blue band around thighs to keep from abducting   Standing hip abd and hamstring curls  15x  x Bilaterally    Dynamic gait in // bars: forward, side stepping, retro; tandem 2 laps  x    Stepping over blue foam 2x   x3 on R, difficult, to encourage bigger steps    Airex:marching, mini squats 10x   x  Restricted ROM with ankles unable to perform heel toe   Standing gastroc stretch in // bars 15 sec 3x     Hurdles  1lap   R lead, L lead   Sitting: LAQ, marching, Hip abduction with green band , adductor squeeze 15 x      Green t band sitting   PF  10 x       Sit to stand 10x  x  no UE support   sitting ankle pumps 20x      Seated hip flexion and abd; LAQ 10x ea      Review of goals; Tinetti         Specific Interventions Next Treatment: NuStep; standing balance activities; dynamic gait activities; endurance     Activity/Treatment Tolerance:  [x]  Patient tolerated treatment well  [x]  Patient limited by fatigue  []  Patient limited by pain   []  Patient limited by medical complications  []  Other:     Assessment:  Rajesh Hickman is able to walk short distances without his [facility] since starting therapy. The edema in his LE is visibly less allowing for improved AROM in both ankles. Body Structures/Functions/Activity Limitations: impaired activity tolerance, impaired balance, impaired coordination, impaired endurance, impaired ROM, impaired sensation, impaired strength and abnormal gait  Prognosis: fair    GOALS:  Patient Goal: Improve balance and stamina    Short Term Goals:  Time Frame: 3 weeks  1. Rajesh Hickman will demonstrate a good ankle strategy when maintaining balance with perturbations to his trunk. GOAL MET   2. Rajesh Hickman will demonstrate \"nose over toes\" positioning with sit-stand transfers without needing verbal cuing. GOAL MET   3. Doug's Tinetti score will improve to 19/28 indicating minimal fall risk. NOT MET: improved to 17/28      Long Term Goals:  Time Frame: 6 weeks   1.  Rajesh Hickman will transition from walking with rollator to [facility] for greater ease with community ambulation and to return to PLOF. GOAL MET   2. Bernardo Hernandez will be discharged from PT with independent HEP. ONGOING  3. Doug's Tinetti score will improve to >23/28 indicating no fall risk. NOT MET  4. Bernardo Hernandez will be able to walk 200 ft at a time without needing a rest break so he is able to walk into the restaurant from his car without fatigue. ONGOING  5. NEW GOAL: Bernardo Hernandez will be able to transition from concrete to grassy surfaces with greater confidence and without loss of balance so he may walk throughout his property without LOB. Patient Education:   []  HEP/Education Completed: ankle pumps with elevated leg, compressions stockings to decrease edema   Medbridge Access Code:  []  No new Education completed  [x]  Reviewed Prior HEP-provided by rehab      [x]  Patient verbalized and/or demonstrated understanding of education provided. []  Patient unable to verbalize and/or demonstrate understanding of education provided. Will continue education. [x]  Barriers to learning: n/a     PLAN:  Treatment Recommendations: Strengthening, Range of Motion, Balance Training, Functional Mobility Training, Endurance Training, Gait Training, Stair Training, Neuromuscular Re-education, Home Exercise Program, Patient Education, Safety Education and Training and Aquatics    []  Plan of care initiated. Plan to see patient 2 times per week for 6 weeks to address the treatment planned outlined above.   [x]  Continue with current plan of care  []  Modify plan of care as follows:    []  Hold pending physician visit  []  Discharge    Time In 0900   Time Out 0942   Timed Code Minutes: 42 min   Total Treatment Time: 42 min       Electronically Signed by: Maggie Villa, HERO Arreola [id number]"Min White DPT  [id number]

## 2021-06-10 NOTE — PROGRESS NOTES
** PLEASE SIGN, DATE AND TIME CERTIFICATION BELOW AND RETURN TO [facility] OUTPATIENT REHABILITATION (FAX #: [phone]). ATTEST/CO-SIGN IF ACCESSING VIA [employer]. THANK YOU.**    I certify that I have examined the patient below and determined that Physical Medicine and Rehabilitation service is necessary and that I approve the established plan of care for up to 90 days or as specifically noted. Attestation, signature or co-signature of physician indicates approval of certification requirements.    ________________________ ____________ __________  Physician Signature   Date   Time       [address] THERAPY  [] EVALUATION  [] DAILY NOTE (LAND) [] DAILY NOTE (AQUATIC )   [x] PROGRESS NOTE [] DISCHARGE NOTE    [] [facility]   [] Dunajs 90    [] [address] [facility]   [x] Edel Primrose    Date: 6/10/2021  Patient Name:  Camron Diaz  : 1945  MRN: [id number]  CSN: [id number]    Referring Practitioner Hershell Bernheim M APRN   Diagnosis GOUT    Treatment Diagnosis Impaired UE strength, impaired endurance, decreased ADLs   Date of Evaluation 21      Functional Outcome Measure Used Functional Impact Questionaire   Functional Outcome Score 68 (21)  38  21       Insurance: Primary: Payor: Nataly Li /  /  / ,   Secondary: [facility]   Authorization Information: Unlimited visits based on medical necessity   Visit # 17, 7/10 for progress note   Visits Allowed: Unlimited based on medical necessity   Recertification Date:    Physician Follow-Up: Not stated   Physician Orders: Script dated 3/25/2021 for OT eval and treat   Pertinent History Patient was admitted to [facility] on 3/12/21 with lower extremity swelling, deconditioned state. Negative for DVT. Patient had inpatient rehab due to deconditioning.   PMH includes per medical notes from [facility] includes: Neuropathy, hypertension, hypercholesterolemia, hyperlipidemia, DVT, Lupus, polyarthritis, anticoagulation, asthma, pneumonia, pulmonary embolism, diabetes, hepatitis B, BPH, arthritis, basal ricky carcinoma, anemia, left total hip replacement, spinal fusion,      SUBJECTIVE: Patient reports he still has numbness in his hands. Patient reports pain in his wrists and hands are better. He states he wants to work on his strength and ROM more in his hands. Patient reports it is difficulty with snapping his suspenders together. OBJECTIVE:      TREATMENT   Precautions: fall risk    Pain: Denies pain in B hands     X in shaded column indicates Activity Completed Today   Modalities Parameters/  Location  Notes/Comments                     Manual Therapy Time/  Technique  Notes/Comments   STM manually to right and left wrists and forearms for pain control and tight musculature                   Exercises   Sets/  Sec Reps  Notes/Comments   Scapular retraction 1 15     AROM right and left digits flexion/extension and abduction/adduction 1 10 each X    Orange theraband for bicep curls  20     [place] theraband in sitting for B lat pull down and rows 1 10 each     Modified prayer stretch 10 seconds 5 X Within ROM   Red flex bar bends in pronation, supination, twists   1     15 each      X        Green spring loaded gripper (20# resistance)  20 each X    [place] theraband in standing for B shoulder flexion and ER 1 10  Stood x 3 minutes then needed rest due to increase in back discomfort. Patient tends to lean back against support during shoulder flexion   PROM to right and left wrist flexion/extension and right and left digits individual and composite flexion/extension.   PROM to right thumb palmar abduction   X          AROM bilateral shoulder flexion in sitting 1 10 each     Orange putty tafjian pulls       Orange putty squeezing/manipulating       Biodex at 95 RPM, 2 minutes forward and 3 minute backwards   X    Activities Time    Notes/Comments Explored taping and strap options to improve thumb opposition in R hand, no success to bring thumb into opposition enough for a functional . Patient would benefit from thumb opposition splint in the future. Adaptive equipment catalog issued   Discussed compression glove for warmth, comfort and compression for arthritis. Information given for ordering with recommended size   Patient ambulated in clinic with cane 30 feet then 20 feet        Specific Interventions Next Treatment: UE strengthening, endurance, HEP, ADLs, adaptive equipment and technique education    Activity/Treatment Tolerance:  [x]  Patient tolerated treatment well  []  Patient limited by fatigue  []  Patient limited by pain, back pain with standing   []  Patient limited by other medical complications  []  Other:     Assessment: Patient has made fair/good progress towards his goals. Patient has improved pinch strength and slightly more right  strength. Patient indicates he feels his shoulder strength is improving. He does have difficulty tolerating standing tasks due to back pain. He indicates he would like to work on improving his hand strength and ease with extending his fingers. Recommend continued OT to increase functional pinch and  strength, endurance, HEP including hand stretching program for increased ease with fastening fastners, opening jars and bottles and return to prior level of functioning. Areas for Improvement: edema, impaired coordination, impaired endurance and impaired strength  Prognosis: fair/good    GOALS:  Patient Goal: Return to driving a car, walk better, and increase strength and endurance, mow the lawn. Short Term Goals:  Time Frame: 5 weeks  1. Patient will be independent with upgraded HEP for increase ease with dressing . CONTINUE GOAL.    2.  Patient will increase stand endurance x 6 minutes during UE functional activity for increased endurance for ambulation to and from car to appointments NOT MET. Patient reports back pain with standing during UE exercises and requires sitting rest break after 3-4 minutes. CONTINUE GOAL     3. Patient will increase B  strength to 35# for increased ease with opening jars. NOT MET Right 30 pounds, left 27 pounds CONTINUE GOAL   4. Patient will explore adaptive equipment and techniques for increased ease with self care and dressing. Patient uses theraband to open wather bottles and has cylinder foam for pen as well as arthritis gloves an anti vibratory gloves. GOAL PARTIALLY MET. CONTINUE GOAL. 5. Increase right lateral pinch to 14 # and left to 9 pounds for increase ease opening packages and picking up pills . GOAL PARTIALLY MET. Right lateral pinch = 12#, left lateral pinch = 10#. REVISED GOAL:  Patient will increase right lateral pinch to 14# and left to 11# for increased ease with opening packages and picking up pills. Long Term Goals:  Time Frame: 8 weeks  1. Patient will improve UEFS to at least 78. GOAL NOT MET. UEFS = 59. CONTINUE GOAL  2. Patient will increase stand endurance to 8 minutes for increased endurance for ADLs. NOT MET. CONTINUE   3. Patient will be independent with UE finalized HEP for ease with eventual return to mowing his lawn. NOT MET. CONTINUE     Patient Education:   []  HEP/Education Completed: Explored options to improve functional /pinch in R hand.  MediaPass Access Code for HEP:   []  No New Education   [x]  Reviewed Prior HEP, plan of care, new schedule given     [x]  Patient verbalized and/or demonstrated understanding of education provided. []  Patient unable to verbalize and/or demonstrate understanding of education provided. Will continue education. [x]  Barriers to learning: none    PLAN:  Treatment Recommendations: Strengthening, Endurance Training and Home Exercise Program    []  Plan of care initiated.   Plan to see patient 2 times per week for 4 more weeks to address the treatment planned outlined

## 2021-06-17 ENCOUNTER — HOSPITAL ENCOUNTER (OUTPATIENT)
Dept: OCCUPATIONAL THERAPY | Age: 76
Setting detail: THERAPIES SERIES
Discharge: HOME | End: 2021-06-17
Payer: MEDICARE

## 2021-06-17 ENCOUNTER — HOSPITAL ENCOUNTER (OUTPATIENT)
Dept: PHYSICAL THERAPY | Age: 76
Setting detail: THERAPIES SERIES
Discharge: HOME | End: 2021-06-17
Payer: MEDICARE

## 2021-06-17 PROCEDURE — 97530 THERAPEUTIC ACTIVITIES: CPT

## 2021-06-17 PROCEDURE — 97112 NEUROMUSCULAR REEDUCATION: CPT

## 2021-06-17 PROCEDURE — 97110 THERAPEUTIC EXERCISES: CPT

## 2021-06-17 NOTE — PROGRESS NOTES
[address] [facility]  PHYSICAL THERAPY  [] EVALUATION  [x] DAILY NOTE (LAND) [] DAILY NOTE (AQUATIC ) [] PROGRESS NOTE [] DISCHARGE NOTE    [] [address] [facility]   [] Brenda [address]    [] [address] [facility]   [x] Reina Pablo    Date: 2021  Patient Name:  Shree Moya  : 1945  MRN: [id number]  CSN: [id number]    Referring Practitioner DEB Ayers -*   Diagnosis Gouty Arthropathy    Treatment Diagnosis Difficulty walking; balance dysfunction    Date of Evaluation 21    Additional Pertinent History DM; SOB; HTN      Functional Outcome Measure Used Tinetti   Functional Outcome Score  (21)    (21)      Insurance: Primary: Payor: Rogenia Runner /  /  / ,   Secondary:    Authorization Information: Pre-certification not needed    Visit # 18, 4/10 for progress note   Visits Allowed: Based on medical necessity    Recertification Date:    Physician Follow-Up:    Physician Orders:    History of Present Illness: Rajesh Hickman was recently discharged from [facility] rehab following hospitalization for cellulitis in left LE. SUBJECTIVE: Wearing his compression stockings; continues to walk in with [facility].      TREATMENT   Precautions:    Pain:  2/10 ankles, 4-5/10 in low back with movement    X in shaded column indicates activity completed today   Modalities Parameters/  Location  Notes                     Manual Therapy Time/Technique  Notes   Stretch right hip flexor off edge of mat--long leg traction to stretch  - 5 min  Also quad sets to straigthen knee      STM throughout left foot/ankle; calcaneal distraction with passive stretch into DF; posterior glide of talus  10 min  Pitting edema noted; assisted putting on compression stockings          Exercise/Intervention   Notes   Discussed HEP provided by rehab staff at [facility]; provided blue band       Forward step ups  5x  x Bilaterally    Nustep Arms 10 , legs 12  Level 5 5 min x Blue band around thighs to keep from abducting   Standing hip abd and hamstring curls  15x   Bilaterally    Dynamic gait in // bars: forward, side stepping, retro; tandem 2 laps  x    Stepping over blue foam 2x   x3 on R, difficult, to encourage bigger steps    Airex:marching, mini squats 10x   x  Restricted ROM with ankles unable to perform heel toe   Standing gastroc stretch in // bars 15 sec 3x     Hurdles  1lap  x R lead, L lead   Sitting: LAQ, marching, Hip abduction with green band , adductor squeeze 15 x      Green t band sitting   PF  10 x       Sit to stand 10x    no UE support   sitting ankle pumps 20x      Seated hip flexion and abd; LAQ 10x ea      Review of goals; Tinetti              Rocker board  x10  x             Specific Interventions Next Treatment: NuStep; standing balance activities; dynamic gait activities; endurance     Activity/Treatment Tolerance:  [x]  Patient tolerated treatment well  [x]  Patient limited by fatigue  []  Patient limited by pain   []  Patient limited by medical complications  []  Other:     Assessment:  Jose Manuel Del Toro is able to walk short distances without his [facility] since starting therapy. The edema in his LE is visibly less allowing for improved AROM in both ankles. Body Structures/Functions/Activity Limitations: impaired activity tolerance, impaired balance, impaired coordination, impaired endurance, impaired ROM, impaired sensation, impaired strength and abnormal gait  Prognosis: fair    GOALS:  Patient Goal: Improve balance and stamina    Short Term Goals:  Time Frame: 3 weeks  1. Jose Manuel Del Toro will demonstrate a good ankle strategy when maintaining balance with perturbations to his trunk. GOAL MET   2. Jose Manuel Del Toro will demonstrate \"nose over toes\" positioning with sit-stand transfers without needing verbal cuing. GOAL MET   3. Doug's Tinetti score will improve to 19/28 indicating minimal fall risk.  NOT MET: improved to 17/28      Long Term Goals:  Time Frame: 6 weeks   1. GILBERTO Hyde will transition from walking with rollator to [facility] for greater ease with community ambulation and to return to [facility]. GOAL MET   2. GILBERTO Hyde will be discharged from PT with independent HEP. ONGOING  3. Doug's Tinetti score will improve to >23/28 indicating no fall risk. NOT MET  4. GILBERTO Hyde will be able to walk 200 ft at a time without needing a rest break so he is able to walk into the restaurant from his car without fatigue. ONGOING  5. NEW GOAL: GILBERTO Hyde will be able to transition from concrete to grassy surfaces with greater confidence and without loss of balance so he may walk throughout his property without LOB. Patient Education:   []  HEP/Education Completed: ankle pumps with elevated leg, compressions stockings to decrease edema   Medbridge Access Code:  []  No new Education completed  [x]  Reviewed Prior HEP-provided by rehab      [x]  Patient verbalized and/or demonstrated understanding of education provided. []  Patient unable to verbalize and/or demonstrate understanding of education provided. Will continue education. [x]  Barriers to learning: n/a     PLAN:  Treatment Recommendations: Strengthening, Range of Motion, Balance Training, Functional Mobility Training, Endurance Training, Gait Training, Stair Training, Neuromuscular Re-education, Home Exercise Program, Patient Education, Safety Education and Training and Aquatics    []  Plan of care initiated. Plan to see patient 2 times per week for 6 weeks to address the treatment planned outlined above.   [x]  Continue with current plan of care  []  Modify plan of care as follows:    []  Hold pending physician visit  []  Discharge    Time In [id number]   Time Out 0922   Timed Code Minutes: 38 min   Total Treatment Time: 38 min       Electronically Signed by: Julius Macias, HERO Arreola [id number]"Francisco Lockett DPT  [id number]

## 2021-06-17 NOTE — PROGRESS NOTES
Notes/Comments   STM manually to right and left wrists and forearms for pain control and tight musculature                   Exercises   Sets/  Sec Reps  Notes/Comments   Scapular retraction 1 15 X    AROM right and left digits flexion/extension and abduction/adduction 1 10 each X    Orange theraband for bicep curls  20     [place] theraband in sitting for B lat pull down and rows 1 10 each     Modified prayer stretch 10 seconds 5 X Within ROM, difficulty with extending digits fully   Red flex bar bends in pronation, supination, twists and turning on vertical end to simulate jar opening   1     15 each      X        Green spring loaded gripper (20# resistance)  20 each X    [place] theraband in standing for B shoulder flexion and ER 1 10  Stood x 3 minutes then needed rest due to increase in back discomfort. Patient tends to lean back against support during shoulder flexion   PROM to right and left wrist flexion/extension and right and left digits individual and composite flexion/extension. PROM to right thumb palmar abduction   X          AROM bilateral shoulder flexion in sitting 1 10 each     Orange putty taffy pulls       Rolling red therabar for B digit extension 1 10 each X    Orange putty squeezing/manipulating       Biodex at 90 RPM, 2 minutes forward and 3 minute backwards   X    Activities Time    Notes/Comments   Explored taping and strap options to improve thumb opposition in R hand, no success to bring thumb into opposition enough for a functional . Patient would benefit from thumb opposition splint in the future. Adaptive equipment catalog issued   Discussed compression glove for warmth, comfort and compression for arthritis.    Information given for ordering with recommended size   Patient ambulated in clinic with cane 30 feet then 20 feet        Specific Interventions Next Treatment: UE strengthening, endurance, HEP, ADLs, adaptive equipment and technique education    Activity/Treatment Tolerance:  [x]  Patient tolerated treatment well  []  Patient limited by fatigue  []  Patient limited by pain, back pain with standing   []  Patient limited by other medical complications  []  Other:     Assessment: Patient is progressing towards his goals. Right digits are stiff. Areas for Improvement: edema, impaired coordination, impaired endurance and impaired strength  Prognosis: fair/good    GOALS:  Patient Goal: Return to driving a car, walk better, and increase strength and endurance, mow the lawn. Short Term Goals:  Time Frame: 5 weeks  1. Patient will be independent with upgraded HEP for increase ease with dressing . CONTINUE GOAL. 2.  Patient will increase stand endurance x 6 minutes during UE functional activity for increased endurance for ambulation to and from car to appointments NOT MET. Patient reports back pain with standing during UE exercises and requires sitting rest break after 3-4 minutes. CONTINUE GOAL     3. Patient will increase B  strength to 35# for increased ease with opening jars. NOT MET Right 30 pounds, left 27 pounds CONTINUE GOAL   4. Patient will explore adaptive equipment and techniques for increased ease with self care and dressing. Patient uses theraband to open wather bottles and has cylinder foam for pen as well as arthritis gloves an anti vibratory gloves. GOAL PARTIALLY MET. CONTINUE GOAL. 5. Increase right lateral pinch to 14 # and left to 9 pounds for increase ease opening packages and picking up pills . GOAL PARTIALLY MET. Right lateral pinch = 12#, left lateral pinch = 10#. REVISED GOAL:  Patient will increase right lateral pinch to 14# and left to 11# for increased ease with opening packages and picking up pills. Long Term Goals:  Time Frame: 8 weeks  1. Patient will improve UEFS to at least 78. GOAL NOT MET. UEFS = 59. CONTINUE GOAL  2. Patient will increase stand endurance to 8 minutes for increased endurance for ADLs. NOT MET.  CONTINUE

## 2021-06-24 ENCOUNTER — APPOINTMENT (OUTPATIENT)
Dept: OCCUPATIONAL THERAPY | Age: 76
End: 2021-06-24
Payer: MEDICARE

## 2021-06-24 ENCOUNTER — HOSPITAL ENCOUNTER (OUTPATIENT)
Dept: PHYSICAL THERAPY | Age: 76
Setting detail: THERAPIES SERIES
End: 2021-06-24
Payer: MEDICARE

## 2021-07-01 ENCOUNTER — HOSPITAL ENCOUNTER (OUTPATIENT)
Dept: PHYSICAL THERAPY | Age: 76
Setting detail: THERAPIES SERIES
Discharge: HOME | End: 2021-07-01
Payer: MEDICARE

## 2021-07-01 ENCOUNTER — HOSPITAL ENCOUNTER (OUTPATIENT)
Dept: OCCUPATIONAL THERAPY | Age: 76
Setting detail: THERAPIES SERIES
Discharge: HOME | End: 2021-07-01
Payer: MEDICARE

## 2021-07-01 PROCEDURE — 97110 THERAPEUTIC EXERCISES: CPT

## 2021-07-01 PROCEDURE — 97530 THERAPEUTIC ACTIVITIES: CPT

## 2021-07-01 PROCEDURE — 97116 GAIT TRAINING THERAPY: CPT

## 2021-07-01 NOTE — PROGRESS NOTES
[address]  [] EVALUATION  [x] DAILY NOTE (LAND) [] DAILY NOTE (AQUATIC )   [] PROGRESS NOTE [] DISCHARGE NOTE    [] [facility]   [] [address]    [] [address] [employer]   [x] Myles Varma    Date: 2021  Patient Name:  Maranda Crawford  : 1945  MRN: [id number]  CSN: [id number]    Referring Practitioner Eric BOYD   Diagnosis GOUT    Treatment Diagnosis Impaired UE strength, impaired endurance, decreased ADLs   Date of Evaluation 21      Functional Outcome Measure Used Functional Impact Questionaire   Functional Outcome Score 68 (21)  38  21       Insurance: Primary: Payor: Juliet George /  /  / ,   Secondary: [facility]   Authorization Information: Unlimited visits based on medical necessity   Visit # 19, 2/10 for progress note   Visits Allowed: Unlimited based on medical necessity   Recertification Date: [id number]   Physician Follow-Up: Not stated   Physician Orders: Script dated 3/25/2021 for OT eval and treat   Pertinent History Patient was admitted to [facility] on 3/12/21 with lower extremity swelling, deconditioned state. Negative for DVT. Patient had inpatient rehab due to deconditioning. PMH includes per medical notes from [facility] includes: Neuropathy, hypertension, hypercholesterolemia, hyperlipidemia, DVT, Lupus, polyarthritis, anticoagulation, asthma, pneumonia, pulmonary embolism, diabetes, hepatitis B, BPH, arthritis, basal ricky carcinoma, anemia, left total hip replacement, spinal fusion,      SUBJECTIVE: Patient reports he had a flair up of gout in his left arm. He states his elbow was swollen and couldn't move his arm well. He states he was given steroids and they helped his arm. OBJECTIVE:      TREATMENT   Precautions: fall risk    Pain: Denies pain in B UEs.      X in shaded column indicates Activity Completed Today   Modalities Parameters/  Location  Notes/Comments                     Manual Therapy Time/  Technique  Notes/Comments   STM manually to right and left wrists and forearms for pain control and tight musculature                   Exercises   Sets/  Sec Reps  Notes/Comments   Scapular retraction 1 10 X    AROM right and left digits flexion/extension and abduction/adduction 1 10 each X    Orange theraband for bicep curls  20     Dowel godfrey for shoulder flexion  1 10 X    Orange theraband in sitting for B lat pull down and rows 1 10 each     Modified prayer stretch 10 seconds 5 X Within ROM, difficulty with extending digits fully   Red flex bar bends in pronation, supination, twists and turning on vertical end to simulate jar opening   1     15 each      X        Green spring loaded gripper (20# resistance)  20 each X    [place] theraband in standing for B shoulder flexion and ER 1 10  Stood x 3 minutes then needed rest due to increase in back discomfort. Patient tends to lean back against support during shoulder flexion   PROM to right and left wrist flexion/extension and right and left digits individual and composite flexion/extension. PROM to right thumb palmar abduction   X          AROM bilateral shoulder flexion in sitting 1 10 each     Orange putty taffy pulls   X    Rolling red therabar for B digit extension 1 10 each X    Orange putty squeezing/manipulating   X    Biodex at 90 RPM, 2 minutes forward and 3 minute backwards   X    Activities Time    Notes/Comments   Explored taping and strap options to improve thumb opposition in R hand, no success to bring thumb into opposition enough for a functional . Patient would benefit from thumb opposition splint in the future. Adaptive equipment catalog issued   Discussed compression glove for warmth, comfort and compression for arthritis.    Information given for ordering with recommended size   Patient ambulated in clinic with cane 30 feet then 20 feet        Specific Interventions Next Treatment: UE strengthening, endurance, HEP, ADLs, adaptive equipment and technique education    Activity/Treatment Tolerance:  [x]  Patient tolerated treatment well  []  Patient limited by fatigue  []  Patient limited by pain, back pain with standing   []  Patient limited by other medical complications  []  Other:     Assessment: Patient is progressing towards his goals. Areas for Improvement: edema, impaired coordination, impaired endurance and impaired strength  Prognosis: fair/good    GOALS:  Patient Goal: Return to driving a car, walk better, and increase strength and endurance, mow the lawn. Short Term Goals:  Time Frame: 5 weeks  1. Patient will be independent with upgraded HEP for increase ease with dressing . CONTINUE GOAL. 2.  Patient will increase stand endurance x 6 minutes during UE functional activity for increased endurance for ambulation to and from car to appointments NOT MET. Patient reports back pain with standing during UE exercises and requires sitting rest break after 3-4 minutes. CONTINUE GOAL     3. Patient will increase B  strength to 35# for increased ease with opening jars. NOT MET Right 30 pounds, left 27 pounds CONTINUE GOAL   4. Patient will explore adaptive equipment and techniques for increased ease with self care and dressing. Patient uses theraband to open wather bottles and has cylinder foam for pen as well as arthritis gloves an anti vibratory gloves. GOAL PARTIALLY MET. CONTINUE GOAL. 5. Increase right lateral pinch to 14 # and left to 9 pounds for increase ease opening packages and picking up pills . GOAL PARTIALLY MET. Right lateral pinch = 12#, left lateral pinch = 10#. REVISED GOAL:  Patient will increase right lateral pinch to 14# and left to 11# for increased ease with opening packages and picking up pills. Long Term Goals:  Time Frame: 8 weeks  1. Patient will improve UEFS to at least 78. GOAL NOT MET. UEFS = 59.   CONTINUE GOAL  2. Patient will increase stand endurance to 8 minutes for increased endurance for ADLs. NOT MET. CONTINUE   3. Patient will be independent with UE finalized HEP for ease with eventual return to mowing his lawn. NOT MET. CONTINUE     Patient Education:   []  HEP/Education Completed: Explored options to improve functional /pinch in R hand.  [employer] Access Code for HEP:   []  No New Education   [x]  Reviewed Prior HEP    [x]  Patient verbalized and/or demonstrated understanding of education provided. []  Patient unable to verbalize and/or demonstrate understanding of education provided. Will continue education. [x]  Barriers to learning: none    PLAN:  Treatment Recommendations: Strengthening, Endurance Training and Home Exercise Program    []  Plan of care initiated. Plan to see patient 2 times per week for 4 more weeks to address the treatment planned outlined above.   [x]  Continue with current plan of care  []  Modify plan of care as follows: 1 x week x 6 weeks  []  Hold pending physician visit  []  Discharge    Time In 0805   Time Out 0845   Timed Code Minutes: 40 min   Total Treatment Time: 40 min       Sue Kim OTR/L #[id number]

## 2021-07-01 NOTE — PROGRESS NOTES
[address] [facility]  PHYSICAL THERAPY  [] EVALUATION  [x] DAILY NOTE (LAND) [] DAILY NOTE (AQUATIC ) [] PROGRESS NOTE [] DISCHARGE NOTE    [] [facility]   [] [address]    [] [address] [facility]   [x] Lupis Corey    Date: 2021  Patient Name:  Gerardo Harrington  : 1945  MRN: [id number]  CSN: [id number]    Referring Practitioner DEB Davila -*   Diagnosis Gouty Arthropathy    Treatment Diagnosis Difficulty walking; balance dysfunction    Date of Evaluation 21    Additional Pertinent History DM; SOB; HTN      Functional Outcome Measure Used Tinetti   Functional Outcome Score  (21)    (21)      Insurance: Primary: Payor: Siobhan Monroe /  /  / ,   Secondary:    Authorization Information: Pre-certification not needed    Visit # 23, 5/10 for progress note   Visits Allowed: Based on medical necessity    Recertification Date:    Physician Follow-Up:    Physician Orders:    History of Present Illness: Elodia Bautista was recently discharged from [facility] rehab following hospitalization for cellulitis in left LE. SUBJECTIVE: Reports increased swelling in bilat ankles.     TREATMENT   Precautions:    Pain:  4-5/10 in low back with movement    X in shaded column indicates activity completed today   Modalities Parameters/  Location  Notes                     Manual Therapy Time/Technique  Notes   Stretch right hip flexor off edge of mat--long leg traction to stretch  - 5 min  Also quad sets to straigthen knee      STM throughout left foot/ankle; calcaneal distraction with passive stretch into DF; posterior glide of talus  10 min  Pitting edema noted; assisted putting on compression stockings          Exercise/Intervention   Notes   Discussed HEP provided by rehab staff at [facility]; provided blue band       Forward step ups  5x  x Bilaterally    Nustep  Arms 10 , legs 12  Level 5 6 min x Blue band around thighs to keep from abducting   Standing hip abd and hamstring curls  15x   Bilaterally    Dynamic gait in // bars: forward, side stepping, retro; tandem 2 laps  x    Stepping over blue foam 2x   x3 on R, difficult, to encourage bigger steps    Airex:marching, mini squats 10x   x  Restricted ROM with ankles unable to perform heel toe   Standing gastroc stretch in // bars 15 sec 3x     Hurdles  1lap  x R lead, L lead   Sitting: LAQ, marching, Hip abduction with green band , adductor squeeze 15 x      Green t band sitting   PF  10 x       Sit to stand 10x    no UE support   sitting ankle pumps 20x      Seated hip flexion and abd; LAQ 10x ea      Review of goals; Tinetti              Rocker board  x10  x    Ambulated  x600 ft  x 4 standing rest breaks required     Specific Interventions Next Treatment: NuStep; standing balance activities; dynamic gait activities; endurance     Activity/Treatment Tolerance:  [x]  Patient tolerated treatment well  [x]  Patient limited by fatigue  []  Patient limited by pain   []  Patient limited by medical complications  []  Other:     Assessment:  Tomás Dos Santos required more seated rest breaks during session today. Unable to complete all there ex due to fatigue. Body Structures/Functions/Activity Limitations: impaired activity tolerance, impaired balance, impaired coordination, impaired endurance, impaired ROM, impaired sensation, impaired strength and abnormal gait  Prognosis: fair    GOALS:  Patient Goal: Improve balance and stamina    Short Term Goals:  Time Frame: 3 weeks  1. Tomás Dos Santos will demonstrate a good ankle strategy when maintaining balance with perturbations to his trunk. GOAL MET   2. Tomás Dos Santos will demonstrate \"nose over toes\" positioning with sit-stand transfers without needing verbal cuing. GOAL MET   3. Doug's Tinetti score will improve to 19/28 indicating minimal fall risk. NOT MET: improved to 17/28      Long Term Goals:  Time Frame: 6 weeks   1.  Tomás Dos Santos will transition from walking with rollator to [facility] for greater ease with community ambulation and to return to [facility]. GOAL MET   2. Nadia Mireles will be discharged from PT with independent HEP. ONGOING  3. Doug's Tinetti score will improve to >23/28 indicating no fall risk. NOT MET  4. Nadia Mireles will be able to walk 200 ft at a time without needing a rest break so he is able to walk into the restaurant from his car without fatigue. ONGOING  5. NEW GOAL: Nadia Mireles will be able to transition from concrete to grassy surfaces with greater confidence and without loss of balance so he may walk throughout his property without LOB. Patient Education:   []  HEP/Education Completed: ankle pumps with elevated leg, compressions stockings to decrease edema   [employer] Access Code:  []  No new Education completed  [x]  Reviewed Prior HEP-provided by rehab      [x]  Patient verbalized and/or demonstrated understanding of education provided. []  Patient unable to verbalize and/or demonstrate understanding of education provided. Will continue education. [x]  Barriers to learning: n/a     PLAN:  Treatment Recommendations: Strengthening, Range of Motion, Balance Training, Functional Mobility Training, Endurance Training, Gait Training, Stair Training, Neuromuscular Re-education, Home Exercise Program, Patient Education, Safety Education and Training and Aquatics    []  Plan of care initiated. Plan to see patient 2 times per week for 6 weeks to address the treatment planned outlined above.   [x]  Continue with current plan of care  []  Modify plan of care as follows:    []  Hold pending physician visit  []  Discharge    Time In 0845   Time Out 0925   Timed Code Minutes: 40 min   Total Treatment Time: 40 min       Electronically Signed by: Courtney Rodríguez

## 2021-07-08 ENCOUNTER — APPOINTMENT (OUTPATIENT)
Dept: OCCUPATIONAL THERAPY | Age: 76
End: 2021-07-08
Payer: MEDICARE

## 2021-07-08 ENCOUNTER — HOSPITAL ENCOUNTER (OUTPATIENT)
Dept: PHYSICAL THERAPY | Age: 76
Setting detail: THERAPIES SERIES
End: 2021-07-08
Payer: MEDICARE

## 2021-07-15 ENCOUNTER — HOSPITAL ENCOUNTER (OUTPATIENT)
Dept: OCCUPATIONAL THERAPY | Age: 76
Setting detail: THERAPIES SERIES
Discharge: HOME | End: 2021-07-15
Payer: MEDICARE

## 2021-07-15 ENCOUNTER — HOSPITAL ENCOUNTER (OUTPATIENT)
Dept: PHYSICAL THERAPY | Age: 76
Setting detail: THERAPIES SERIES
Discharge: HOME | End: 2021-07-15
Payer: MEDICARE

## 2021-07-15 PROCEDURE — 97110 THERAPEUTIC EXERCISES: CPT

## 2021-07-15 PROCEDURE — 97140 MANUAL THERAPY 1/> REGIONS: CPT

## 2021-07-15 NOTE — DISCHARGE SUMMARY
[address]  [] EVALUATION  [] DAILY NOTE (LAND) [] DAILY NOTE (AQUATIC )   [] PROGRESS NOTE [x] DISCHARGE NOTE    [] OUTPATIENT REHABILITATION [facility]   [] [place] [address]    [] [facility]   [x] April Orlando    Date: 7/15/2021  Patient Name:  Leena Dolan  : 1945  MRN: [id number]  CSN: [id number]    Referring Practitioner Deneen BOYD   Diagnosis GOUT    Treatment Diagnosis Impaired UE strength, impaired endurance, decreased ADLs   Date of Evaluation 21      Functional Outcome Measure Used Functional Impact Questionaire   Functional Outcome Score 68 (21)  38  (21) 64/80 (7/15/21)      Insurance: Primary: Payor: MEDICARE /  /  / ,   Secondary: [employer] [place]   Authorization Information: Unlimited visits based on medical necessity   Visit # 20, 3/10 for progress note   Visits Allowed: Unlimited based on medical necessity   Recertification Date: 1992   Physician Follow-Up: Not stated   Physician Orders: Script dated 3/25/2021 for OT eval and treat   Pertinent History Patient was admitted to [facility] on 3/12/21 with lower extremity swelling, deconditioned state. Negative for DVT. Patient had inpatient rehab due to deconditioning. PMH includes per medical notes from [facility] includes: Neuropathy, hypertension, hypercholesterolemia, hyperlipidemia, DVT, Lupus, polyarthritis, anticoagulation, asthma, pneumonia, pulmonary embolism, diabetes, hepatitis B, BPH, arthritis, basal ricky carcinoma, anemia, left total hip replacement, spinal fusion,      SUBJECTIVE: Patient states his gout came back. He indicates he is back to being able to [employer]. Patient reports he is comfortable with discharge from formal OT and continuing with HEP on his own. Patient states he is on medicine for his gout. OBJECTIVE:      TREATMENT   Precautions: fall risk    Pain: Denies pain in B UEs.      X in shaded column indicates Activity Completed Today   Modalities Parameters/  Location  Notes/Comments                     Manual Therapy Time/  Technique  Notes/Comments   STM manually to right and left wrists and forearms for pain control and tight musculature                   Exercises   Sets/  Sec Reps  Notes/Comments   Scapular retraction 1 15 X    AROM right and left digits flexion/extension and abduction/adduction 1 10 each X    Orange theraband for bicep curls  20     Dowel godfrey for shoulder flexion  1 10     [place] theraband in sitting for B lat pull down and rows 1 10 each     Modified prayer stretch 10 seconds 5  Within ROM, difficulty with extending digits fully   Red flex bar bends in pronation, supination, twists and turning on vertical end to simulate jar opening   1     15 each      X        Green spring loaded gripper (20# resistance)  20 each     [place] theraband in standing for B shoulder flexion and ER 1 10  Stood x 3 minutes then needed rest due to increase in back discomfort. Patient tends to lean back against support during shoulder flexion   PROM to right and left wrist flexion/extension and right and left digits individual and composite flexion/extension. PROM to right thumb palmar abduction   X          AROM bilateral shoulder flexion in sitting 1 10 each     Orange putty taffy pulls   X    Rolling red therabar for B digit extension 1 10 each X    Orange putty squeezing/manipulating   X    Biodex at 90 RPM, 2 minutes forward and 3 minute backwards   X    Activities Time    Notes/Comments   Explored taping and strap options to improve thumb opposition in R hand, no success to bring thumb into opposition enough for a functional . Patient would benefit from thumb opposition splint in the future. Adaptive equipment catalog issued   Goal assessment completed. X    Discussed compression glove for warmth, comfort and compression for arthritis.    Information given for ordering with recommended size Patient ambulated in clinic with cane 30 feet then 20 feet        Specific Interventions Next Treatment: UE strengthening, endurance, HEP, ADLs, adaptive equipment and technique education    Activity/Treatment Tolerance:  [x]  Patient tolerated treatment well  []  Patient limited by fatigue  []  Patient limited by pain, back pain with standing   []  Patient limited by other medical complications  []  Other:     Assessment: Patient has made fair/good progress towards his goals. He has improved pinch strength but reports a flair up with gout. Bilateral  remains relatively the same. He is independent with his HEP and has returned to mowing his lawn and reports improved ease with dressing. He is aware of various adaptive equipment options and where to obtain them. Patient is agreeable to discharge from formal OT and complete HEP on his own. Areas for Improvement: edema, impaired coordination, impaired endurance and impaired strength  Prognosis: fair/good    GOALS:  Patient Goal: Return to driving a car, walk better, and increase strength and endurance, mow the lawn. Short Term Goals:  Time Frame: 5 weeks  1. Patient will be independent with upgraded HEP for increase ease with dressing. GOAL MET. Patient reports normally he does his HEP 1 time a day. Patient reports increased ease with dressing. Discharge Goal.   2.  Patient will increase stand endurance x 6 minutes during UE functional activity for increased endurance for ambulation to and from car to appointments NOT MET. Patient reports back pain with standing during UE exercises and requires sitting rest break after 3-4 minutes. Discharge Goal.   3.  Patient will increase B  strength to 35# for increased ease with opening jars. GOAL NOT MET Right  =  30 pounds, left 29 pounds. Discharge Goal.   4.  Patient will explore adaptive equipment and techniques for increased ease with self care and dressing.  Patient has good awareness of adaptive equipment options available. He states he wears anti vibration gloves when driving his mower. Discharge Goal.     5. Patient will increase right lateral pinch to 14# and left to 11# for increased ease with opening packages and picking up pills. GOAL MET. Right lateral pinch = 17#, left lateral pinch = 13#. Discharge Goal.   Long Term Goals:  Time Frame: 8 weeks  1. Patient will improve UEFS to at least 78. GOAL NOT MET. UEFS = 64. Discharge Goal.  2.  Patient will increase stand endurance to 8 minutes for increased endurance for ADLs. GOAL NOT MET. Discharge Goal.    3.  Patient will be independent with UE finalized HEP for ease with eventual return to mowing his lawn. GOAL MET. Patient is independent with his HEP and has returned to mowing. Discharge Goal.       Patient Education:   []  HEP/Education Completed: Explored options to improve functional /pinch in R hand.  [employer] Access Code for HEP:   []  No New Education   [x]  Reviewed Prior HEP , Discharge plan  [x]  Patient verbalized and/or demonstrated understanding of education provided. []  Patient unable to verbalize and/or demonstrate understanding of education provided. Will continue education. [x]  Barriers to learning: none    PLAN:  Treatment Recommendations: Strengthening, Endurance Training and Home Exercise Program    []  Plan of care initiated. Plan to see patient 2 times per week for 4 more weeks to address the treatment planned outlined above.   []  Continue with current plan of care  []  Modify plan of care as follows: 1 x week x 6 weeks  []  Hold pending physician visit  [x]  Discharge    Time In [id number]   Time Out 9640   Timed Code Minutes: 45 min   Total Treatment Time: 45 min       Tootie Abbott OTR/L #[id number]

## 2021-07-15 NOTE — PROGRESS NOTES
[address] [facility]  PHYSICAL THERAPY  [] EVALUATION  [] DAILY NOTE (LAND) [] DAILY NOTE (AQUATIC ) [x] PROGRESS NOTE [] DISCHARGE NOTE    [] [facility] [place]   [] Brenda Lee    [] [address] [facility]   [x] Thuan Rose    Date: 7/15/2021  Patient Name:  Jake Zendejas  : 1945  MRN: [id number]  CSN: [id number]    Referring Practitioner DEB Craig -*   Diagnosis Gouty Arthropathy    Treatment Diagnosis Difficulty walking; balance dysfunction    Date of Evaluation 21    Additional Pertinent History DM; SOB; HTN      Functional Outcome Measure Used Tinetti   Functional Outcome Score  (21)    (21)      Insurance: Primary: Payor: Delwyn Osler /  /  / ,   Secondary:    Authorization Information: Pre-certification not needed    Visit # 20, 0/10 for progress note   Visits Allowed: Based on medical necessity    Recertification Date:    Physician Follow-Up:    Physician Orders:    History of Present Illness: Cooper Feng was recently discharged from [facility] [place], operated by [facility] rehab following hospitalization for cellulitis in left LE. SUBJECTIVE: Has had flare ups with his arthritis.     TREATMENT   Precautions:    Pain:  4-5/10 in low back with movement    X in shaded column indicates activity completed today   Modalities Parameters/  Location  Notes                     Manual Therapy Time/Technique  Notes   Stretch right hip flexor off edge of mat--long leg traction to stretch  - 5 min  Also quad sets to straigthen knee      STM throughout bilateral foot/ankle; calcaneal distraction with passive stretch into DF; posterior glide of talus  30 min x Pitting edema noted; assisted putting on compression stockings          Exercise/Intervention   Notes   Discussed HEP provided by rehab staff at [facility] [place], operated by [facility]; provided blue band       Forward step ups  5x   Bilaterally    Nustep  Arms 10 , legs 12  Level 5 7 min x Blue band around thighs to keep from abducting   Standing hip abd and hamstring curls  15x   Bilaterally    Dynamic gait in // bars: forward, side stepping, retro; tandem 2 laps      Stepping over blue foam 2x   x3 on R, difficult, to encourage bigger steps    Airex:marching, mini squats 10x    Restricted ROM with ankles unable to perform heel toe   Standing gastroc stretch in // bars 15 sec 3x     Hurdles  1lap   R lead, L lead   Sitting: LAQ, marching, Hip abduction with green band , adductor squeeze 15 x      Green t band sitting   PF  10 x       Sit to stand 10x    no UE support   sitting ankle pumps 20x      Seated hip flexion and abd; LAQ 10x ea      Review of goals; Tinetti              Rocker board  x10      Ambulated  x600 ft   4 standing rest breaks required     Specific Interventions Next Treatment: NuStep; standing balance activities; dynamic gait activities; endurance     Activity/Treatment Tolerance:  [x]  Patient tolerated treatment well  [x]  Patient limited by fatigue  []  Patient limited by pain   []  Patient limited by medical complications  []  Other:     Assessment: Greater emphasis on STM/stretching to bilateral ankles to improve mobility with gait. Cristine Donovan may discharge; will wait based on follow up with physician tomorrow. Body Structures/Functions/Activity Limitations: impaired activity tolerance, impaired balance, impaired coordination, impaired endurance, impaired ROM, impaired sensation, impaired strength and abnormal gait  Prognosis: fair    GOALS:  Patient Goal: Improve balance and stamina    Short Term Goals:  Time Frame: 3 weeks  1. Cristine Donovan will demonstrate a good ankle strategy when maintaining balance with perturbations to his trunk. GOAL MET   2. Cristine Donovan will demonstrate \"nose over toes\" positioning with sit-stand transfers without needing verbal cuing. GOAL MET   3. Doug's Tinetti score will improve to 19/28 indicating minimal fall risk.  NOT MET: improved to 17/28      Long Term Goals:  Time Frame: 6 weeks   1. Dany Johnston will transition from walking with rollator to [facility] for greater ease with community ambulation and to return to [facility]. GOAL MET   2. Dany Johnston will be discharged from PT with independent HEP. ONGOING  3. Doug's Tinetti score will improve to >23/28 indicating no fall risk. NOT MET  4. Dany Johnston will be able to walk 200 ft at a time without needing a rest break so he is able to walk into the restaurant from his car without fatigue. ONGOING  5. NEW GOAL: Dany Johnston will be able to transition from concrete to grassy surfaces with greater confidence and without loss of balance so he may walk throughout his property without LOB. Patient Education:   []  HEP/Education Completed: ankle pumps with elevated leg, compressions stockings to decrease edema   [employer] Access Code:  []  No new Education completed  [x]  Reviewed Prior HEP-provided by rehab      [x]  Patient verbalized and/or demonstrated understanding of education provided. []  Patient unable to verbalize and/or demonstrate understanding of education provided. Will continue education. [x]  Barriers to learning: n/a     PLAN:  Treatment Recommendations: Strengthening, Range of Motion, Balance Training, Functional Mobility Training, Endurance Training, Gait Training, Stair Training, Neuromuscular Re-education, Home Exercise Program, Patient Education, Safety Education and Training and Aquatics    []  Plan of care initiated. Plan to see patient 2 times per week for 6 weeks to address the treatment planned outlined above.   []  Continue with current plan of care  []  Modify plan of care as follows:    [x]  Hold pending physician visit  []  Discharge    Time In 0845   Time Out 0925   Timed Code Minutes: 40 min   Total Treatment Time: 40 min       Electronically Signed by: Zac Petty, HERO Arreola [id number]"RUBY HedrickT  [id number]

## 2021-09-03 ENCOUNTER — HOSPITAL ENCOUNTER (OUTPATIENT)
Dept: PHYSICAL THERAPY | Age: 76
Setting detail: THERAPIES SERIES
Discharge: HOME | End: 2021-09-03
Payer: MEDICARE

## 2021-09-03 NOTE — DISCHARGE SUMMARY
Hector Juarez NOTE  OUTPATIENT  [facility]    Patient Name: Etta Paul        CSN: [id number]   YOB: 1945  Gender: male  DEB White -*,    No admission diagnoses are documented for this encounter. ,      Patient is discharged from Physical Therapy services at this time. See last note for details related to results of therapy and goal achievement. Reason for discharge: Was on hold, however it has been over a month since he was last seen. Domingo Hernandez \"TESS\Ele Head, DPT  [id number]

## 2023-07-18 PROBLEM — M25.462 EFFUSION, LEFT KNEE: Status: ACTIVE | Noted: 2023-07-18

## 2023-07-18 PROBLEM — M10.9 GOUT: Status: ACTIVE | Noted: 2023-07-18

## 2023-07-18 PROBLEM — R60.0 EDEMA OF LOWER EXTREMITY: Status: ACTIVE | Noted: 2023-07-18

## 2023-07-18 PROBLEM — I10 ESSENTIAL HYPERTENSION: Status: ACTIVE | Noted: 2021-04-02

## 2023-07-18 PROBLEM — D64.9 ANEMIA: Status: ACTIVE | Noted: 2023-07-18

## 2023-07-18 PROBLEM — N40.0 BENIGN PROSTATIC HYPERPLASIA: Status: ACTIVE | Noted: 2023-07-18

## 2023-07-18 PROBLEM — E03.9 HYPOTHYROIDISM: Status: ACTIVE | Noted: 2023-07-18

## 2023-07-18 PROBLEM — E83.42 HYPOMAGNESEMIA: Status: ACTIVE | Noted: 2023-07-18

## 2023-07-18 PROBLEM — Z79.01 LONG TERM (CURRENT) USE OF ANTICOAGULANTS: Status: ACTIVE | Noted: 2023-07-18

## 2023-07-18 PROBLEM — M19.90 ARTHRITIS: Status: ACTIVE | Noted: 2023-07-18

## 2023-07-18 PROBLEM — N18.30 STAGE 3 CHRONIC KIDNEY DISEASE (HCC): Status: ACTIVE | Noted: 2023-07-18

## 2023-07-18 PROBLEM — K92.2 GASTROINTESTINAL HEMORRHAGE: Status: ACTIVE | Noted: 2023-07-18

## 2023-07-18 PROBLEM — R70.0 ELEVATED ERYTHROCYTE SEDIMENTATION RATE: Status: ACTIVE | Noted: 2023-07-18

## 2023-07-18 PROBLEM — D68.62 LUPUS ANTICOAGULANT DISORDER (HCC): Status: ACTIVE | Noted: 2023-07-18

## 2023-07-18 PROBLEM — E78.5 HYPERLIPIDEMIA: Status: ACTIVE | Noted: 2023-07-18

## 2023-07-18 PROBLEM — T80.92XA BLOOD TRANSFUSION REACTION: Status: ACTIVE | Noted: 2023-07-18

## 2023-07-18 PROBLEM — E11.9 TYPE 2 DIABETES MELLITUS (HCC): Status: ACTIVE | Noted: 2023-07-18

## 2023-07-18 PROBLEM — I44.7 LEFT BUNDLE BRANCH BLOCK (LBBB): Status: ACTIVE | Noted: 2023-07-18

## 2023-07-18 PROBLEM — E87.5 HYPERKALEMIA, DIMINISHED RENAL EXCRETION: Status: ACTIVE | Noted: 2023-07-18

## 2023-07-18 PROBLEM — E63.9 DEFICIENCY OF MACRONUTRIENTS: Status: ACTIVE | Noted: 2023-07-18

## 2023-07-18 PROBLEM — R00.1 BRADYCARDIA: Status: ACTIVE | Noted: 2023-07-18

## 2024-02-18 NOTE — PROGRESS NOTES
** PLEASE SIGN, DATE AND TIME CERTIFICATION BELOW AND RETURN TO [facility] OUTPATIENT REHABILITATION (FAX #: [phone]). ATTEST/CO-SIGN IF ACCESSING VIA [employer]. THANK YOU.**    I certify that I have examined the patient below and determined that Physical Medicine and Rehabilitation service is necessary and that I approve the established plan of care for up to 90 days or as specifically noted. Attestation, signature or co-signature of physician indicates approval of certification requirements.    ________________________ ____________ __________  Physician Signature   Date   Time      [address] [facility]  PHYSICAL THERAPY  [] EVALUATION  [] DAILY NOTE (LAND) [] DAILY NOTE (AQUATIC ) [x] PROGRESS NOTE [] DISCHARGE NOTE    [] [facility]   [] [place] [address]    [] [address] [facility]   [x] Dave Betancur    Date: 2021  Patient Name:  Taya Morgan  : 1945  MRN: [id number]  CSN: [id number]    Referring Practitioner DEB Navarrete -*   Diagnosis Gouty Arthropathy    Treatment Diagnosis Difficulty walking; balance dysfunction    Date of Evaluation 21    Additional Pertinent History DM; SOB; HTN      Functional Outcome Measure Used Tinetti   Functional Outcome Score  (21)    (21)      Insurance: Primary: Payor: Huang Chen /  /  / ,   Secondary:    Authorization Information: Pre-certification not needed    Visit # 14, 4/10 for progress note   Visits Allowed: Based on medical necessity    Recertification Date:    Physician Follow-Up:    Physician Orders:    History of Present Illness: Adeel Coobs was recently discharged from [facility] [place], operated by [facility] rehab following hospitalization for cellulitis in left LE.      SUBJECTIVE: Pt walked in with [facility] today; working on his edema     TREATMENT   Precautions:    Pain:  3/10 L ankle, 2/10 R knee    X in shaded column indicates activity completed today   Modalities This patient is now awake and is answering questions. She is still confused but she is no longer pulling at her lines or trying to climb out of bed. Marilyn goodman. Family at bedside.    will demonstrate a good ankle strategy when maintaining balance with perturbations to his trunk. GOAL MET   2. Jarad Starr will demonstrate \"nose over toes\" positioning with sit-stand transfers without needing verbal cuing. GOAL MET   3. Alvins Tinetti score will improve to 19/28 indicating minimal fall risk. NOT MET: improved to 17/28      Long Term Goals:  Time Frame: 6 weeks   1. Jarad Starr will transition from walking with rollator to [facility] for greater ease with community ambulation and to return to [facility]. GOAL MET   2. Jarad Starr will be discharged from PT with independent HEP. ONGOING  3. Alvins Tinetti score will improve to >23/28 indicating no fall risk. NOT MET  4. Jarad Starr will be able to walk 200 ft at a time without needing a rest break so he is able to walk into the restaurant from his car without fatigue. ONGOING  5. NEW GOAL: Jarad Starr will be able to transition from concrete to grassy surfaces with greater confidence and without loss of balance so he may walk throughout his property without LOB. Patient Education:   [x]  HEP/Education Completed: ankle pumps with elevated leg, compressions stockings to decrease edema   Medbridge Access Code:  []  No new Education completed  [x]  Reviewed Prior HEP-provided by rehab      [x]  Patient verbalized and/or demonstrated understanding of education provided. []  Patient unable to verbalize and/or demonstrate understanding of education provided. Will continue education. [x]  Barriers to learning: n/a     PLAN:  Treatment Recommendations: Strengthening, Range of Motion, Balance Training, Functional Mobility Training, Endurance Training, Gait Training, Stair Training, Neuromuscular Re-education, Home Exercise Program, Patient Education, Safety Education and Training and Aquatics    []  Plan of care initiated. Plan to see patient 2 times per week for 6 weeks to address the treatment planned outlined above.   [x]  Continue with current plan of care  []  Modify plan of care as follows:    []  Hold pending physician visit  []  Discharge    Time In 0845   Time Out 0925   Timed Code Minutes: 40 min   Total Treatment Time: 40 min       Electronically Signed by: Miller Rodriguez, PT   Anthony Arreola 7081" RUBY MurciaT  [id number]